# Patient Record
Sex: FEMALE | Race: WHITE | NOT HISPANIC OR LATINO | Employment: UNEMPLOYED | URBAN - METROPOLITAN AREA
[De-identification: names, ages, dates, MRNs, and addresses within clinical notes are randomized per-mention and may not be internally consistent; named-entity substitution may affect disease eponyms.]

---

## 2017-04-05 ENCOUNTER — ALLSCRIPTS OFFICE VISIT (OUTPATIENT)
Dept: OTHER | Facility: OTHER | Age: 32
End: 2017-04-05

## 2017-04-05 DIAGNOSIS — D25.9 LEIOMYOMA OF UTERUS: ICD-10-CM

## 2017-04-05 DIAGNOSIS — N93.9 ABNORMAL UTERINE AND VAGINAL BLEEDING, UNSPECIFIED: ICD-10-CM

## 2017-04-06 ENCOUNTER — HOSPITAL ENCOUNTER (OUTPATIENT)
Dept: RADIOLOGY | Age: 32
Discharge: HOME | End: 2017-04-06
Payer: COMMERCIAL

## 2017-04-06 DIAGNOSIS — N93.9 ABNORMAL UTERINE AND VAGINAL BLEEDING, UNSPECIFIED: ICD-10-CM

## 2017-04-06 DIAGNOSIS — D25.9 LEIOMYOMA OF UTERUS: ICD-10-CM

## 2017-04-06 PROCEDURE — 76830 TRANSVAGINAL US NON-OB: CPT

## 2017-04-06 PROCEDURE — 76856 US EXAM PELVIC COMPLETE: CPT

## 2017-04-11 ENCOUNTER — GENERIC CONVERSION - ENCOUNTER (OUTPATIENT)
Dept: OTHER | Facility: OTHER | Age: 32
End: 2017-04-11

## 2017-04-17 ENCOUNTER — GENERIC CONVERSION - ENCOUNTER (OUTPATIENT)
Dept: OTHER | Facility: OTHER | Age: 32
End: 2017-04-17

## 2017-04-21 ENCOUNTER — ALLSCRIPTS OFFICE VISIT (OUTPATIENT)
Dept: OTHER | Facility: OTHER | Age: 32
End: 2017-04-21

## 2017-04-24 ENCOUNTER — HOSPITAL ENCOUNTER (OUTPATIENT)
Facility: HOSPITAL | Age: 32
Setting detail: OBSERVATION
Discharge: HOME | End: 2017-04-24
Attending: EMERGENCY MEDICINE | Admitting: OBSTETRICS & GYNECOLOGY
Payer: COMMERCIAL

## 2017-04-24 VITALS
WEIGHT: 140 LBS | SYSTOLIC BLOOD PRESSURE: 104 MMHG | DIASTOLIC BLOOD PRESSURE: 55 MMHG | HEART RATE: 97 BPM | RESPIRATION RATE: 16 BRPM | OXYGEN SATURATION: 99 % | TEMPERATURE: 97.9 F

## 2017-04-24 DIAGNOSIS — N93.9 VAGINAL BLEEDING: Primary | ICD-10-CM

## 2017-04-24 DIAGNOSIS — D50.0 BLOOD LOSS ANEMIA: ICD-10-CM

## 2017-04-24 LAB
ABO GROUP BLD BPU: NORMAL
ABO GROUP BLD BPU: NORMAL
ABO GROUP BLD: NORMAL
ANION GAP SERPL CALCULATED.3IONS-SCNC: 9 MMOL/L (ref 4–13)
APTT PPP: 22 SECONDS (ref 24–36)
ATRIAL RATE: 80 BPM
BASOPHILS # BLD AUTO: 0.02 THOUSANDS/ΜL (ref 0–0.1)
BASOPHILS NFR BLD AUTO: 0 % (ref 0–1)
BLD GP AB SCN SERPL QL: NEGATIVE
BPU ID: NORMAL
BPU ID: NORMAL
BUN SERPL-MCNC: 12 MG/DL (ref 5–25)
CALCIUM SERPL-MCNC: 7.9 MG/DL (ref 8.3–10.1)
CHLORIDE SERPL-SCNC: 106 MMOL/L (ref 100–108)
CO2 SERPL-SCNC: 24 MMOL/L (ref 21–32)
CREAT SERPL-MCNC: 0.59 MG/DL (ref 0.6–1.3)
EOSINOPHIL # BLD AUTO: 0.04 THOUSAND/ΜL (ref 0–0.61)
EOSINOPHIL NFR BLD AUTO: 1 % (ref 0–6)
ERYTHROCYTE [DISTWIDTH] IN BLOOD BY AUTOMATED COUNT: 15.3 % (ref 11.6–15.1)
GFR SERPL CREATININE-BSD FRML MDRD: >60 ML/MIN/1.73SQ M
GLUCOSE SERPL-MCNC: 111 MG/DL (ref 65–140)
HCT VFR BLD AUTO: 20.9 % (ref 34.8–46.1)
HGB BLD-MCNC: 6.8 G/DL (ref 11.5–15.4)
INR PPP: 1.08 (ref 0.86–1.16)
LYMPHOCYTES # BLD AUTO: 2.88 THOUSANDS/ΜL (ref 0.6–4.47)
LYMPHOCYTES NFR BLD AUTO: 43 % (ref 14–44)
MCH RBC QN AUTO: 25.8 PG (ref 26.8–34.3)
MCHC RBC AUTO-ENTMCNC: 32.5 G/DL (ref 31.4–37.4)
MCV RBC AUTO: 79 FL (ref 82–98)
MONOCYTES # BLD AUTO: 0.44 THOUSAND/ΜL (ref 0.17–1.22)
MONOCYTES NFR BLD AUTO: 7 % (ref 4–12)
NEUTROPHILS # BLD AUTO: 3.32 THOUSANDS/ΜL (ref 1.85–7.62)
NEUTS SEG NFR BLD AUTO: 49 % (ref 43–75)
NRBC BLD AUTO-RTO: 0 /100 WBCS
P AXIS: 67 DEGREES
PLATELET # BLD AUTO: 248 THOUSANDS/UL (ref 149–390)
PMV BLD AUTO: 9.9 FL (ref 8.9–12.7)
POTASSIUM SERPL-SCNC: 3.4 MMOL/L (ref 3.5–5.3)
PR INTERVAL: 150 MS
PROTHROMBIN TIME: 14.1 SECONDS (ref 12–14.3)
QRS AXIS: 60 DEGREES
QRSD INTERVAL: 80 MS
QT INTERVAL: 350 MS
QTC INTERVAL: 403 MS
RBC # BLD AUTO: 2.64 MILLION/UL (ref 3.81–5.12)
RH BLD: NEGATIVE
SODIUM SERPL-SCNC: 139 MMOL/L (ref 136–145)
SPECIMEN EXPIRATION DATE: NORMAL
T WAVE AXIS: 37 DEGREES
UNIT DISPENSE STATUS: NORMAL
UNIT DISPENSE STATUS: NORMAL
UNIT PRODUCT CODE: NORMAL
UNIT PRODUCT CODE: NORMAL
UNIT RH: NORMAL
UNIT RH: NORMAL
VENTRICULAR RATE: 80 BPM
WBC # BLD AUTO: 6.73 THOUSAND/UL (ref 4.31–10.16)

## 2017-04-24 PROCEDURE — 36415 COLL VENOUS BLD VENIPUNCTURE: CPT | Performed by: EMERGENCY MEDICINE

## 2017-04-24 PROCEDURE — 86901 BLOOD TYPING SEROLOGIC RH(D): CPT | Performed by: EMERGENCY MEDICINE

## 2017-04-24 PROCEDURE — 86850 RBC ANTIBODY SCREEN: CPT | Performed by: EMERGENCY MEDICINE

## 2017-04-24 PROCEDURE — 80048 BASIC METABOLIC PNL TOTAL CA: CPT | Performed by: EMERGENCY MEDICINE

## 2017-04-24 PROCEDURE — 93005 ELECTROCARDIOGRAM TRACING: CPT | Performed by: EMERGENCY MEDICINE

## 2017-04-24 PROCEDURE — 99284 EMERGENCY DEPT VISIT MOD MDM: CPT

## 2017-04-24 PROCEDURE — 86923 COMPATIBILITY TEST ELECTRIC: CPT

## 2017-04-24 PROCEDURE — 85730 THROMBOPLASTIN TIME PARTIAL: CPT | Performed by: EMERGENCY MEDICINE

## 2017-04-24 PROCEDURE — 85025 COMPLETE CBC W/AUTO DIFF WBC: CPT | Performed by: EMERGENCY MEDICINE

## 2017-04-24 PROCEDURE — 86900 BLOOD TYPING SEROLOGIC ABO: CPT | Performed by: EMERGENCY MEDICINE

## 2017-04-24 PROCEDURE — 85610 PROTHROMBIN TIME: CPT | Performed by: EMERGENCY MEDICINE

## 2017-04-24 PROCEDURE — P9016 RBC LEUKOCYTES REDUCED: HCPCS

## 2017-04-24 PROCEDURE — 36430 TRANSFUSION BLD/BLD COMPNT: CPT

## 2017-04-24 RX ORDER — TRANEXAMIC ACID 650 1/1
1300 TABLET ORAL 3 TIMES DAILY
Status: DISCONTINUED | OUTPATIENT
Start: 2017-04-24 | End: 2017-04-24

## 2017-04-24 RX ORDER — TRANEXAMIC ACID 650 1/1
1300 TABLET ORAL 3 TIMES DAILY
Status: DISCONTINUED | OUTPATIENT
Start: 2017-04-24 | End: 2017-04-24 | Stop reason: HOSPADM

## 2017-04-24 RX ORDER — TRANEXAMIC ACID 650 1/1
1300 TABLET ORAL 3 TIMES DAILY
Qty: 30 TABLET | Refills: 0
Start: 2017-04-24 | End: 2017-04-26 | Stop reason: HOSPADM

## 2017-04-24 RX ADMIN — SODIUM CHLORIDE 1000 ML: 0.9 INJECTION, SOLUTION INTRAVENOUS at 06:04

## 2017-04-24 RX ADMIN — TRANEXAMIC ACID 2 TABLET: 650 TABLET ORAL at 12:49

## 2017-04-24 RX ADMIN — TRANEXAMIC ACID 2 TABLET: 650 TABLET, FILM COATED ORAL at 17:13

## 2017-04-25 ENCOUNTER — APPOINTMENT (OUTPATIENT)
Dept: LAB | Facility: HOSPITAL | Age: 32
End: 2017-04-25
Attending: OBSTETRICS & GYNECOLOGY
Payer: COMMERCIAL

## 2017-04-25 ENCOUNTER — HOSPITAL ENCOUNTER (OUTPATIENT)
Facility: HOSPITAL | Age: 32
Setting detail: OBSERVATION
Discharge: HOME | End: 2017-04-26
Attending: OBSTETRICS & GYNECOLOGY | Admitting: OBSTETRICS & GYNECOLOGY
Payer: COMMERCIAL

## 2017-04-25 ENCOUNTER — GENERIC CONVERSION - ENCOUNTER (OUTPATIENT)
Dept: OTHER | Facility: OTHER | Age: 32
End: 2017-04-25

## 2017-04-25 DIAGNOSIS — N93.9 ABNORMAL UTERINE AND VAGINAL BLEEDING, UNSPECIFIED: ICD-10-CM

## 2017-04-25 DIAGNOSIS — D25.9 LEIOMYOMA OF UTERUS: ICD-10-CM

## 2017-04-25 LAB
ABO GROUP BLD BPU: NORMAL
ABO GROUP BLD BPU: NORMAL
BPU ID: NORMAL
BPU ID: NORMAL
ERYTHROCYTE [DISTWIDTH] IN BLOOD BY AUTOMATED COUNT: 16.1 % (ref 11.6–15.1)
HCT VFR BLD AUTO: 19.4 % (ref 34.8–46.1)
HCT VFR BLD AUTO: 23.2 % (ref 34.8–46.1)
HGB BLD-MCNC: 6.4 G/DL (ref 11.5–15.4)
HGB BLD-MCNC: 7.8 G/DL (ref 11.5–15.4)
MCH RBC QN AUTO: 27.1 PG (ref 26.8–34.3)
MCHC RBC AUTO-ENTMCNC: 33 G/DL (ref 31.4–37.4)
MCV RBC AUTO: 82 FL (ref 82–98)
PLATELET # BLD AUTO: 230 THOUSANDS/UL (ref 149–390)
PMV BLD AUTO: 9.6 FL (ref 8.9–12.7)
RBC # BLD AUTO: 2.36 MILLION/UL (ref 3.81–5.12)
UNIT DISPENSE STATUS: NORMAL
UNIT DISPENSE STATUS: NORMAL
UNIT PRODUCT CODE: NORMAL
UNIT PRODUCT CODE: NORMAL
UNIT RH: NORMAL
UNIT RH: NORMAL
WBC # BLD AUTO: 7.33 THOUSAND/UL (ref 4.31–10.16)

## 2017-04-25 PROCEDURE — 85027 COMPLETE CBC AUTOMATED: CPT

## 2017-04-25 PROCEDURE — 86923 COMPATIBILITY TEST ELECTRIC: CPT

## 2017-04-25 PROCEDURE — 85018 HEMOGLOBIN: CPT | Performed by: OBSTETRICS & GYNECOLOGY

## 2017-04-25 PROCEDURE — 99285 EMERGENCY DEPT VISIT HI MDM: CPT

## 2017-04-25 PROCEDURE — 36415 COLL VENOUS BLD VENIPUNCTURE: CPT

## 2017-04-25 PROCEDURE — 85014 HEMATOCRIT: CPT | Performed by: OBSTETRICS & GYNECOLOGY

## 2017-04-25 PROCEDURE — P9040 RBC LEUKOREDUCED IRRADIATED: HCPCS

## 2017-04-25 RX ORDER — ACETAMINOPHEN 325 MG/1
650 TABLET ORAL EVERY 6 HOURS PRN
Status: DISCONTINUED | OUTPATIENT
Start: 2017-04-25 | End: 2017-04-26

## 2017-04-25 RX ORDER — DIPHENHYDRAMINE HYDROCHLORIDE 50 MG/ML
25 INJECTION INTRAMUSCULAR; INTRAVENOUS ONCE
Status: COMPLETED | OUTPATIENT
Start: 2017-04-25 | End: 2017-04-25

## 2017-04-25 RX ORDER — ONDANSETRON 2 MG/ML
4 INJECTION INTRAMUSCULAR; INTRAVENOUS EVERY 6 HOURS PRN
Status: DISCONTINUED | OUTPATIENT
Start: 2017-04-25 | End: 2017-04-27 | Stop reason: HOSPADM

## 2017-04-25 RX ORDER — METOCLOPRAMIDE HYDROCHLORIDE 5 MG/ML
10 INJECTION INTRAMUSCULAR; INTRAVENOUS EVERY 6 HOURS PRN
Status: DISCONTINUED | OUTPATIENT
Start: 2017-04-25 | End: 2017-04-27 | Stop reason: HOSPADM

## 2017-04-25 RX ORDER — ACETAMINOPHEN 325 MG/1
650 TABLET ORAL ONCE
Status: COMPLETED | OUTPATIENT
Start: 2017-04-25 | End: 2017-04-25

## 2017-04-25 RX ADMIN — SODIUM CHLORIDE, SODIUM LACTATE, POTASSIUM CHLORIDE, AND CALCIUM CHLORIDE 1000 ML: .6; .31; .03; .02 INJECTION, SOLUTION INTRAVENOUS at 12:18

## 2017-04-25 RX ADMIN — DIPHENHYDRAMINE HYDROCHLORIDE 25 MG: 50 INJECTION, SOLUTION INTRAMUSCULAR; INTRAVENOUS at 22:12

## 2017-04-25 RX ADMIN — CONJUGATED ESTROGENS 25 MG: 25 INJECTION, POWDER, LYOPHILIZED, FOR SOLUTION INTRAMUSCULAR; INTRAVENOUS at 14:56

## 2017-04-25 RX ADMIN — ACETAMINOPHEN 650 MG: 325 TABLET, FILM COATED ORAL at 22:11

## 2017-04-25 RX ADMIN — ACETAMINOPHEN 650 MG: 325 TABLET, FILM COATED ORAL at 19:10

## 2017-04-25 RX ADMIN — ONDANSETRON 4 MG: 2 INJECTION INTRAMUSCULAR; INTRAVENOUS at 19:12

## 2017-04-26 VITALS
WEIGHT: 137.13 LBS | HEART RATE: 92 BPM | RESPIRATION RATE: 14 BRPM | DIASTOLIC BLOOD PRESSURE: 66 MMHG | OXYGEN SATURATION: 100 % | TEMPERATURE: 98.5 F | SYSTOLIC BLOOD PRESSURE: 111 MMHG | BODY MASS INDEX: 23.41 KG/M2 | HEIGHT: 64 IN

## 2017-04-26 LAB
ABO GROUP BLD BPU: NORMAL
BASOPHILS # BLD AUTO: 0.04 THOUSANDS/ΜL (ref 0–0.1)
BASOPHILS NFR BLD AUTO: 0 % (ref 0–1)
BPU ID: NORMAL
EOSINOPHIL # BLD AUTO: 0.24 THOUSAND/ΜL (ref 0–0.61)
EOSINOPHIL NFR BLD AUTO: 2 % (ref 0–6)
ERYTHROCYTE [DISTWIDTH] IN BLOOD BY AUTOMATED COUNT: 15.7 % (ref 11.6–15.1)
HCT VFR BLD AUTO: 24 % (ref 34.8–46.1)
HCT VFR BLD AUTO: 31.8 % (ref 34.8–46.1)
HGB BLD-MCNC: 10.8 G/DL (ref 11.5–15.4)
HGB BLD-MCNC: 8.1 G/DL (ref 11.5–15.4)
LYMPHOCYTES # BLD AUTO: 4.14 THOUSANDS/ΜL (ref 0.6–4.47)
LYMPHOCYTES NFR BLD AUTO: 32 % (ref 14–44)
MCH RBC QN AUTO: 28.4 PG (ref 26.8–34.3)
MCHC RBC AUTO-ENTMCNC: 33.8 G/DL (ref 31.4–37.4)
MCV RBC AUTO: 84 FL (ref 82–98)
MONOCYTES # BLD AUTO: 0.83 THOUSAND/ΜL (ref 0.17–1.22)
MONOCYTES NFR BLD AUTO: 6 % (ref 4–12)
NEUTROPHILS # BLD AUTO: 7.77 THOUSANDS/ΜL (ref 1.85–7.62)
NEUTS SEG NFR BLD AUTO: 60 % (ref 43–75)
NRBC BLD AUTO-RTO: 0 /100 WBCS
PLATELET # BLD AUTO: 231 THOUSANDS/UL (ref 149–390)
PMV BLD AUTO: 10 FL (ref 8.9–12.7)
RBC # BLD AUTO: 2.85 MILLION/UL (ref 3.81–5.12)
UNIT DISPENSE STATUS: NORMAL
UNIT PRODUCT CODE: NORMAL
UNIT RH: NORMAL
WBC # BLD AUTO: 13.07 THOUSAND/UL (ref 4.31–10.16)

## 2017-04-26 PROCEDURE — 85025 COMPLETE CBC W/AUTO DIFF WBC: CPT | Performed by: OBSTETRICS & GYNECOLOGY

## 2017-04-26 PROCEDURE — P9038 RBC IRRADIATED: HCPCS

## 2017-04-26 PROCEDURE — 85018 HEMOGLOBIN: CPT | Performed by: OBSTETRICS & GYNECOLOGY

## 2017-04-26 PROCEDURE — P9040 RBC LEUKOREDUCED IRRADIATED: HCPCS

## 2017-04-26 PROCEDURE — 85014 HEMATOCRIT: CPT | Performed by: OBSTETRICS & GYNECOLOGY

## 2017-04-26 RX ORDER — ACETAMINOPHEN 325 MG/1
650 TABLET ORAL EVERY 6 HOURS PRN
Status: DISCONTINUED | OUTPATIENT
Start: 2017-04-26 | End: 2017-04-27 | Stop reason: HOSPADM

## 2017-04-26 RX ORDER — DIPHENHYDRAMINE HYDROCHLORIDE 50 MG/ML
25 INJECTION INTRAMUSCULAR; INTRAVENOUS EVERY 6 HOURS PRN
Status: DISCONTINUED | OUTPATIENT
Start: 2017-04-26 | End: 2017-04-27 | Stop reason: HOSPADM

## 2017-04-26 RX ADMIN — DIPHENHYDRAMINE HYDROCHLORIDE 25 MG: 50 INJECTION, SOLUTION INTRAMUSCULAR; INTRAVENOUS at 09:50

## 2017-04-26 RX ADMIN — ACETAMINOPHEN 650 MG: 325 TABLET, FILM COATED ORAL at 09:50

## 2017-05-03 ENCOUNTER — ALLSCRIPTS OFFICE VISIT (OUTPATIENT)
Dept: OTHER | Facility: OTHER | Age: 32
End: 2017-05-03

## 2017-07-18 ENCOUNTER — TRANSCRIBE ORDERS (OUTPATIENT)
Dept: ADMINISTRATIVE | Facility: HOSPITAL | Age: 32
End: 2017-07-18

## 2017-07-18 DIAGNOSIS — D25.9 UTERINE LEIOMYOMA, UNSPECIFIED LOCATION: Primary | ICD-10-CM

## 2017-08-08 ENCOUNTER — HOSPITAL ENCOUNTER (OUTPATIENT)
Dept: RADIOLOGY | Age: 32
Discharge: HOME | End: 2017-08-08
Payer: COMMERCIAL

## 2017-08-08 DIAGNOSIS — D25.9 UTERINE LEIOMYOMA, UNSPECIFIED LOCATION: ICD-10-CM

## 2017-08-08 PROCEDURE — 76830 TRANSVAGINAL US NON-OB: CPT

## 2017-08-08 PROCEDURE — 76856 US EXAM PELVIC COMPLETE: CPT

## 2017-10-28 ENCOUNTER — TRANSCRIBE ORDERS (OUTPATIENT)
Dept: ADMINISTRATIVE | Facility: HOSPITAL | Age: 32
End: 2017-10-28

## 2017-10-28 DIAGNOSIS — Z34.81 PRENATAL CARE, SUBSEQUENT PREGNANCY, FIRST TRIMESTER: ICD-10-CM

## 2017-10-28 DIAGNOSIS — Z13.0 SCREENING FOR IRON DEFICIENCY ANEMIA: Primary | ICD-10-CM

## 2017-10-28 DIAGNOSIS — N92.6 IRREGULAR MENSTRUAL CYCLE: ICD-10-CM

## 2017-10-31 ENCOUNTER — APPOINTMENT (OUTPATIENT)
Dept: LAB | Age: 32
End: 2017-10-31
Payer: COMMERCIAL

## 2017-10-31 ENCOUNTER — HOSPITAL ENCOUNTER (OUTPATIENT)
Dept: RADIOLOGY | Age: 32
Discharge: HOME | End: 2017-10-31
Payer: COMMERCIAL

## 2017-10-31 DIAGNOSIS — Z34.81 PRENATAL CARE, SUBSEQUENT PREGNANCY, FIRST TRIMESTER: ICD-10-CM

## 2017-10-31 DIAGNOSIS — N92.6 IRREGULAR MENSTRUAL CYCLE: ICD-10-CM

## 2017-10-31 DIAGNOSIS — Z13.0 SCREENING FOR IRON DEFICIENCY ANEMIA: ICD-10-CM

## 2017-10-31 LAB
B-HCG SERPL-ACNC: ABNORMAL MIU/ML
TSH SERPL DL<=0.05 MIU/L-ACNC: 1.54 UIU/ML (ref 0.36–3.74)

## 2017-10-31 PROCEDURE — 84702 CHORIONIC GONADOTROPIN TEST: CPT

## 2017-10-31 PROCEDURE — 86803 HEPATITIS C AB TEST: CPT

## 2017-10-31 PROCEDURE — 76801 OB US < 14 WKS SINGLE FETUS: CPT

## 2017-10-31 PROCEDURE — 84443 ASSAY THYROID STIM HORMONE: CPT | Performed by: OBSTETRICS & GYNECOLOGY

## 2017-10-31 PROCEDURE — 83020 HEMOGLOBIN ELECTROPHORESIS: CPT

## 2017-10-31 PROCEDURE — 36415 COLL VENOUS BLD VENIPUNCTURE: CPT

## 2017-11-01 LAB — HCV AB SER QL: NORMAL

## 2017-11-02 ENCOUNTER — APPOINTMENT (OUTPATIENT)
Dept: LAB | Age: 32
End: 2017-11-02
Payer: COMMERCIAL

## 2017-11-02 ENCOUNTER — TRANSCRIBE ORDERS (OUTPATIENT)
Dept: ADMINISTRATIVE | Age: 32
End: 2017-11-02

## 2017-11-02 DIAGNOSIS — Z13.0 SCREENING FOR IRON DEFICIENCY ANEMIA: ICD-10-CM

## 2017-11-02 DIAGNOSIS — Z34.81 PRENATAL CARE, SUBSEQUENT PREGNANCY, FIRST TRIMESTER: ICD-10-CM

## 2017-11-02 DIAGNOSIS — Z34.81 PRENATAL CARE, SUBSEQUENT PREGNANCY, FIRST TRIMESTER: Primary | ICD-10-CM

## 2017-11-02 LAB
BASOPHILS # BLD AUTO: 0.01 THOUSANDS/ΜL (ref 0–0.1)
BASOPHILS NFR BLD AUTO: 0 % (ref 0–1)
BILIRUB UR QL STRIP: NEGATIVE
CLARITY UR: CLEAR
COLOR UR: YELLOW
EOSINOPHIL # BLD AUTO: 0.06 THOUSAND/ΜL (ref 0–0.61)
EOSINOPHIL NFR BLD AUTO: 1 % (ref 0–6)
ERYTHROCYTE [DISTWIDTH] IN BLOOD BY AUTOMATED COUNT: 14.4 % (ref 11.6–15.1)
GLUCOSE UR STRIP-MCNC: NEGATIVE MG/DL
HCT VFR BLD AUTO: 34.3 % (ref 34.8–46.1)
HGB BLD-MCNC: 11.4 G/DL (ref 11.5–15.4)
HGB UR QL STRIP.AUTO: NEGATIVE
KETONES UR STRIP-MCNC: NEGATIVE MG/DL
LEUKOCYTE ESTERASE UR QL STRIP: NEGATIVE
LYMPHOCYTES # BLD AUTO: 2.06 THOUSANDS/ΜL (ref 0.6–4.47)
LYMPHOCYTES NFR BLD AUTO: 29 % (ref 14–44)
MCH RBC QN AUTO: 26.3 PG (ref 26.8–34.3)
MCHC RBC AUTO-ENTMCNC: 33.2 G/DL (ref 31.4–37.4)
MCV RBC AUTO: 79 FL (ref 82–98)
MONOCYTES # BLD AUTO: 0.46 THOUSAND/ΜL (ref 0.17–1.22)
MONOCYTES NFR BLD AUTO: 7 % (ref 4–12)
NEUTROPHILS # BLD AUTO: 4.51 THOUSANDS/ΜL (ref 1.85–7.62)
NEUTS SEG NFR BLD AUTO: 63 % (ref 43–75)
NITRITE UR QL STRIP: NEGATIVE
NRBC BLD AUTO-RTO: 0 /100 WBCS
PH UR STRIP.AUTO: 6.5 [PH] (ref 4.5–8)
PLATELET # BLD AUTO: 226 THOUSANDS/UL (ref 149–390)
PMV BLD AUTO: 9.7 FL (ref 8.9–12.7)
PROT UR STRIP-MCNC: NEGATIVE MG/DL
RBC # BLD AUTO: 4.33 MILLION/UL (ref 3.81–5.12)
SP GR UR STRIP.AUTO: 1.03 (ref 1–1.03)
UROBILINOGEN UR QL STRIP.AUTO: 0.2 E.U./DL
WBC # BLD AUTO: 7.12 THOUSAND/UL (ref 4.31–10.16)

## 2017-11-02 PROCEDURE — 80081 OBSTETRIC PANEL INC HIV TSTG: CPT

## 2017-11-02 PROCEDURE — 36415 COLL VENOUS BLD VENIPUNCTURE: CPT

## 2017-11-02 PROCEDURE — 87086 URINE CULTURE/COLONY COUNT: CPT

## 2017-11-02 PROCEDURE — 81003 URINALYSIS AUTO W/O SCOPE: CPT

## 2017-11-02 PROCEDURE — 86870 RBC ANTIBODY IDENTIFICATION: CPT

## 2017-11-03 LAB
ABO GROUP BLD: NORMAL
BACTERIA UR CULT: ABNORMAL
BLD GP AB SCN SERPL QL: POSITIVE
BLOOD GROUP ANTIBODIES SERPL: NORMAL
HBV SURFACE AG SER QL: NORMAL
HGB A MFR BLD: 2.5 % (ref 0.7–3.1)
HGB A MFR BLD: 97.5 % (ref 94–98)
HGB C MFR BLD: 0 %
HGB F MFR BLD: 0 % (ref 0–2)
HGB FRACT BLD-IMP: NORMAL
HGB S BLD QL SOLY: NEGATIVE
HGB S MFR BLD: 0 %
HIV 1+2 AB+HIV1 P24 AG SERPL QL IA: NORMAL
RH BLD: NEGATIVE
RPR SER QL: NORMAL
RUBV IGG SERPL IA-ACNC: >175 IU/ML
SPECIMEN EXPIRATION DATE: NORMAL

## 2017-11-03 PROCEDURE — 36415 COLL VENOUS BLD VENIPUNCTURE: CPT

## 2017-11-27 PROCEDURE — 87591 N.GONORRHOEAE DNA AMP PROB: CPT | Performed by: OBSTETRICS & GYNECOLOGY

## 2017-11-27 PROCEDURE — G0145 SCR C/V CYTO,THINLAYER,RESCR: HCPCS | Performed by: OBSTETRICS & GYNECOLOGY

## 2017-11-27 PROCEDURE — 87491 CHLMYD TRACH DNA AMP PROBE: CPT | Performed by: OBSTETRICS & GYNECOLOGY

## 2017-11-27 PROCEDURE — 87624 HPV HI-RISK TYP POOLED RSLT: CPT | Performed by: OBSTETRICS & GYNECOLOGY

## 2017-11-28 ENCOUNTER — LAB REQUISITION (OUTPATIENT)
Dept: LAB | Facility: HOSPITAL | Age: 32
End: 2017-11-28
Payer: COMMERCIAL

## 2017-11-28 DIAGNOSIS — Z11.51 ENCOUNTER FOR SCREENING FOR HUMAN PAPILLOMAVIRUS (HPV): ICD-10-CM

## 2017-11-28 DIAGNOSIS — Z12.72 ENCOUNTER FOR SCREENING FOR MALIGNANT NEOPLASM OF VAGINA: ICD-10-CM

## 2017-11-28 DIAGNOSIS — Z01.419 ENCOUNTER FOR GYNECOLOGICAL EXAMINATION WITHOUT ABNORMAL FINDING: ICD-10-CM

## 2017-11-30 ENCOUNTER — GENERIC CONVERSION - ENCOUNTER (OUTPATIENT)
Dept: OTHER | Facility: OTHER | Age: 32
End: 2017-11-30

## 2017-11-30 LAB
CHLAMYDIA DNA CVX QL NAA+PROBE: NORMAL
N GONORRHOEA DNA GENITAL QL NAA+PROBE: NORMAL

## 2017-12-04 ENCOUNTER — GENERIC CONVERSION - ENCOUNTER (OUTPATIENT)
Dept: OTHER | Facility: OTHER | Age: 32
End: 2017-12-04

## 2017-12-04 ENCOUNTER — LAB REQUISITION (OUTPATIENT)
Dept: LAB | Facility: HOSPITAL | Age: 32
End: 2017-12-04
Payer: COMMERCIAL

## 2017-12-04 ENCOUNTER — ALLSCRIPTS OFFICE VISIT (OUTPATIENT)
Dept: PERINATAL CARE | Facility: CLINIC | Age: 32
End: 2017-12-04
Payer: COMMERCIAL

## 2017-12-04 DIAGNOSIS — Z12.72 ENCOUNTER FOR SCREENING FOR MALIGNANT NEOPLASM OF VAGINA: ICD-10-CM

## 2017-12-04 DIAGNOSIS — Z01.419 ENCOUNTER FOR GYNECOLOGICAL EXAMINATION WITHOUT ABNORMAL FINDING: ICD-10-CM

## 2017-12-04 DIAGNOSIS — Z11.51 ENCOUNTER FOR SCREENING FOR HUMAN PAPILLOMAVIRUS (HPV): ICD-10-CM

## 2017-12-04 PROCEDURE — 76801 OB US < 14 WKS SINGLE FETUS: CPT | Performed by: OBSTETRICS & GYNECOLOGY

## 2017-12-04 PROCEDURE — 76813 OB US NUCHAL MEAS 1 GEST: CPT | Performed by: OBSTETRICS & GYNECOLOGY

## 2017-12-07 ENCOUNTER — LAB CONVERSION - ENCOUNTER (OUTPATIENT)
Dept: OTHER | Facility: OTHER | Age: 32
End: 2017-12-07

## 2017-12-07 LAB
AGE RISK DOWN SYNDROME (HISTORICAL): NORMAL
CALC'D GESTATIONAL AGE (HISTORICAL): 12.7
CHLAMYDIA DNA CVX QL NAA+PROBE: NORMAL
CIGARETTE SMOKER (HISTORICAL): NO
COLLECTION DATE (HISTORICAL): NORMAL
CROWN RUMP LENGTH (HISTORICAL): 65 MM
CROWN RUMP LENGTH (HISTORICAL): NORMAL MM
DATE OF BIRTH (HISTORICAL): NORMAL
DONOR AGE; EGG RETRIEVAL (HISTORICAL): NORMAL
DONOR EGG (HISTORICAL): NO
EDD DETERMINED BY (HISTORICAL): NORMAL
ESTIMATED DELIVERY DATE (EDD) (HISTORICAL): NORMAL
HCG MOM (HISTORICAL): 0.36
HCG QUANTITATIVE (HISTORICAL): 31.7 IU/ML
HX OF NEURAL TUBE DEFECTS (HISTORICAL): NO
IF TWINS (HISTORICAL): NORMAL
INSULIN DEP. DIABETIC (HISTORICAL): NO
INTERPRETATION (HISTORICAL): NORMAL
MATERNAL WEIGHT (HISTORICAL): 139 LBS
MSS DOWN SYNDROME RISK (HISTORICAL): NORMAL
MSS3 TRISOMY 18 RISK (HISTORICAL): NORMAL
N GONORRHOEA DNA GENITAL QL NAA+PROBE: NORMAL
NASAL BONE (HISTORICAL): NORMAL
NASAL BONE (HISTORICAL): PRESENT
NT MOM (HISTORICAL): 1.02
NTQR LOCATION ID (HISTORICAL): NORMAL
NTQR READING PHYS ID (HISTORICAL): NORMAL
NUCHAL TRANSLUCENCY (HISTORICAL): 1.5 MM
NUCHAL TRANSLUCENCY (HISTORICAL): NORMAL MM
NUMBER OF FETUSES (HISTORICAL): 1
PAPP-A (HISTORICAL): 0.69
PAPP-A (HISTORICAL): 689.3 NG/ML
PREV PREGNANCY DOWN SYND (HISTORICAL): NO
RACE/ETHNIC ORIGIN (HISTORICAL): NORMAL
REFERRING PHYSICIAN (HISTORICAL): NORMAL
REFERRING PHYSICIAN NPI (HISTORICAL): NORMAL
REFERRING PHYSICIAN PHONE (HISTORICAL): NORMAL
REPEAT SPECIMEN (HISTORICAL): NO
ULTRASONOGRAPHER ID (HISTORICAL): NORMAL
ULTRASOUND DATE (HISTORICAL): NORMAL

## 2017-12-08 LAB — HPV RRNA GENITAL QL NAA+PROBE: NORMAL

## 2017-12-12 LAB
LAB AP GYN PRIMARY INTERPRETATION: NORMAL
LAB AP LMP: NORMAL
Lab: NORMAL

## 2017-12-29 ENCOUNTER — LAB CONVERSION - ENCOUNTER (OUTPATIENT)
Dept: OTHER | Facility: OTHER | Age: 32
End: 2017-12-29

## 2017-12-29 LAB
AFP (HISTORICAL): 0.95
AFP (HISTORICAL): 31.9 NG/ML
AGE RISK DOWN SYNDROME (HISTORICAL): NORMAL
CALC'D GESTATIONAL AGE (HISTORICAL): 15.9
CIGARETTE SMOKER (HISTORICAL): NO
COLLECTION DATE (HISTORICAL): NORMAL
CROWN RUMP LENGTH (HISTORICAL): 65 MM
DATE OF BIRTH (HISTORICAL): NORMAL
ESTIMATED DELIVERY DATE (EDD) (HISTORICAL): NORMAL
ESTRADIOL, FREE (HISTORICAL): 0.57 NG/ML
ESTRIOL MOM (HISTORICAL): 0.75
HCG MOM (HISTORICAL): 0.16
HCG QUANTITATIVE (HISTORICAL): 5.8 IU/ML
HX OF NEURAL TUBE DEFECTS (HISTORICAL): NO
INHIBIN A (HISTORICAL): 60 PG/ML
INHIBIN A MOM (HISTORICAL): 0.34
INSULIN DEP. DIABETIC (HISTORICAL): NO
INTERPRETATION (HISTORICAL): NORMAL
MATERNAL WEIGHT (HISTORICAL): 139 LBS
MSAFP RISK OPEN NTD (HISTORICAL): NORMAL
MSS DOWN SYNDROME RISK (HISTORICAL): NORMAL
MSS3 TRISOMY 18 RISK (HISTORICAL): NORMAL
NASAL BONE (HISTORICAL): NORMAL
NASAL BONE (HISTORICAL): PRESENT
NT MOM (HISTORICAL): 1.02
NUCHAL TRANSLUCENCY (HISTORICAL): 1.5 MM
NUMBER OF FETUSES (HISTORICAL): 1
PAPP-A (HISTORICAL): 0.69
PAPP-A (HISTORICAL): 689.3 NG/ML
RACE/ETHNIC ORIGIN (HISTORICAL): NORMAL
REFERRING PHYSICIAN (HISTORICAL): NORMAL
REFERRING PHYSICIAN NPI (HISTORICAL): NORMAL
REFERRING PHYSICIAN PHONE (HISTORICAL): NORMAL
REPEAT SPECIMEN (HISTORICAL): NO
SPECIMEN: (HISTORICAL): NORMAL
ULTRASOUND DATE (HISTORICAL): NORMAL

## 2017-12-30 ENCOUNTER — HOSPITAL ENCOUNTER (OUTPATIENT)
Facility: HOSPITAL | Age: 32
Discharge: HOME | End: 2017-12-30
Attending: OBSTETRICS & GYNECOLOGY | Admitting: OBSTETRICS & GYNECOLOGY
Payer: COMMERCIAL

## 2017-12-30 VITALS — HEIGHT: 64 IN | RESPIRATION RATE: 16 BRPM | TEMPERATURE: 98 F | BODY MASS INDEX: 24.75 KG/M2 | WEIGHT: 145 LBS
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
## 2017-12-30 PROCEDURE — 99201 HB OFFICE/OUTPATIENT VISIT NEW (BRIEF): CPT

## 2017-12-31 NOTE — DISCHARGE INSTRUCTIONS
Pregnancy at 15 to 18 [address]:   Now that you are in your second trimester, you have more energy  You may also feel hungrier than usual  You may start to experience other symptoms, such as heartburn or dizziness  You may be gaining about ½ to 1 pound a week, and your pregnancy is beginning to show  You may need to start wearing maternity clothes  DISCHARGE INSTRUCTIONS:   Seek care immediately if:   · You have pain or cramping in your abdomen or low back  · You have heavy vaginal bleeding or clotting  · You pass material that looks like tissue or large clots  Collect the material and bring it with you  Contact your healthcare provider if:   · You cannot keep food or drinks down, and you are losing weight  · You have light bleeding  · You have chills or a fever  · You have vaginal itching, burning, or pain  · You have yellow, green, white, or foul-smelling vaginal discharge  · You have pain or burning when you urinate, less urine than usual, or pink or bloody urine  · You have questions or concerns about your condition or care  How to care for yourself at this stage of your pregnancy:   · Manage heartburn  by eating 4 or 5 small meals each day instead of large meals  Avoid spicy foods  Avoid eating right before bedtime  · Manage nausea and vomiting  Avoid fatty and spicy foods  Eat small meals throughout the day instead of large meals  Lynn may help to decrease nausea  Ask your healthcare provider about other ways of decreasing nausea and vomiting  · Eat a variety of healthy foods  Healthy foods include fruits, vegetables, whole-grain breads, low-fat dairy foods, beans, lean meats, and fish  Drink liquids as directed  Ask how much liquid to drink each day and which liquids are best for you  Limit caffeine to less than 200 milligrams each day  Limit your intake of fish to 2 servings each week   Choose fish low in mercury such as canned light tuna, shrimp, salmon, cod, or tilapia  Do not  eat fish high in mercury such as swordfish, tilefish, ana mackerel, and shark  · Take prenatal vitamins as directed  Your need for certain vitamins and minerals, such as folic acid, increases during pregnancy  Prenatal vitamins provide some of the extra vitamins and minerals you need  Prenatal vitamins may also help to decrease the risk of certain birth defects  · Do not smoke  If you smoke, it is never too late to quit  Smoking increases your risk of a miscarriage and other health problems during your pregnancy  Smoking can cause your baby to be born too early or weigh less at birth  Ask your healthcare provider for information if you need help quitting  · Do not drink alcohol  Alcohol passes from your body to your baby through the placenta  It can affect your baby's brain development and cause fetal alcohol syndrome (FAS)  FAS is a group of conditions that causes mental, behavior, and growth problems  · Talk to your healthcare provider before you take any medicines  Many medicines may harm your baby if you take them when you are pregnant  Do not take any medicines, vitamins, herbs, or supplements without first talking to your healthcare provider  Never use illegal or street drugs (such as marijuana or cocaine) while you are pregnant  Safety tips during pregnancy:   · Avoid hot tubs and saunas  Do not use a hot tub or sauna while you are pregnant, especially during your first trimester  Hot tubs and saunas may raise your baby's temperature and increase the risk of birth defects  · Avoid toxoplasmosis  This is an infection caused by eating raw meat or being around infected cat feces  It can cause birth defects, miscarriages, and other problems  Wash your hands after you touch raw meat  Make sure any meat is well-cooked before you eat it  Avoid raw eggs and unpasteurized milk   Use gloves or ask someone else to clean your cat's litter box while you are pregnant  Changes that are happening with your baby:  By 18 weeks, your baby may be about 6 inches long from the top of the head to the rump (baby's bottom)  Your baby may weighabout 11 ounces  You may be able to feel your baby's movement at about 18 weeks or later  The first movements may not be that noticeable  They may feel like a fluttering sensation  Your baby also makes sucking movements and can hear certain sounds  What you need to know about prenatal care:  During the first 28 weeks of your pregnancy, you will see your healthcare provider once a month  Your healthcare provider will check your blood pressure and weight  You may also need any of the following:  · A urine test  may also be done to check for sugar and protein  These can be signs of gestational diabetes or infection  · A blood test  may be done to check for anemia (low iron level)  · Fundal height check  is a measurement of your uterus to check your baby's growth  This number is usually the same as the number of weeks that you have been pregnant  · An ultrasound  may be done to check your baby's development  Your healthcare provider may be able to tell you what your baby's gender is during the ultrasound  · Your baby's heart rate  will be checked  © 2017 [address] Information is for End User's use only and may not be sold, redistributed or otherwise used for commercial purposes  All illustrations and images included in CareNotes® are the copyrighted property of A D A M , Inc  or Nawaf Jimenez  The above information is an  only  It is not intended as medical advice for individual conditions or treatments  Talk to your doctor, nurse or pharmacist before following any medical regimen to see if it is safe and effective for you

## 2017-12-31 NOTE — PROGRESS NOTES
OB TRIAGE NOTE  Matt Mullins  [id number]  2017  10:25 PM  L&D 329/L&D 329-02    ASSESS:  29 y o  P2 at ~16-17y wks gestation s/p fall    PLAN  #1  Pregnancy s/p fall:   Rh neg, s/p RhoGam 6 wks ago  Repeat Rhogam not indicated  No vaginal bleeding  FHT and ultrasound reassuring  Pt reassured, discharged home     #2  Large fibroid  Pt aware of diagnosis  Delivered 2x at term with fibroid    Dispo: discharge to home  ______________    SUBJECTIVE  HPI Chronology:  29 y o   at ~16 wks gestatopm presents with complaint of fall early today while playing with her son  She fell to her side and landed on her right hip  She reports right hip/thigh is tender, but she is able to ambulate  She is worried about her baby and wants reassurance  Of note, pt has large fibroid and has delivered x 2 ( and C/S, breech)    Contractions: no  Leakage: no  Bleeding: no  Fetal Movement: yes  Pelvic pain: no    OBJECTIVE  Vitals:    17 2213   Resp: 16   Temp: 98 °F (36 7 °C)   TempSrc: Oral   Weight: 65 8 kg (145 lb)   Height: 5' 4" (1 626 m)       Exam:  Physical Exam   Constitutional: She appears well-developed and well-nourished  No distress  HENT:   Head: Normocephalic and atraumatic  Pulmonary/Chest: No respiratory distress  Abdominal: Soft  She exhibits no distension  There is no tenderness  There is no rebound  Skin: Skin is warm and dry  Psychiatric: She has a normal mood and affect  Her behavior is normal      FHR: 140 by dopplers    Labs:  Results for Emerita Mathews (MRN [id number]) as of 2017 22:25   Ref   Range 2017 16:33   ABO Grouping Unknown A   Rh Factor Unknown Negative   Antibody Screen Unknown Positive   Specimen Expiration Date Unknown [id number]   RPR SCREEN Latest Ref Range: Non-Reactive  Non-Reactive   HEPATITIS B SURFACE ANTIGEN Latest Ref Range: Non-reactive, NonReactive - Confirmed  Non-reactive   RUBELLA IGG QUANTITATION Latest Ref Range: >9 9 IU/mL >175 0   HIV-1/2 AB-AG Latest Ref Range: Non-Reactive  Non-Reactive     Imaging:  Large 12cm fibroid anteriorly  Fetus at fundus and in variable position  (+) fetal movement with adequate fluid

## 2018-01-10 NOTE — RESULT NOTES
Verified Results  ([facility]) Pap IG, rfx HPV ASCU [id number] 07:68QO Kun Rojas     Test Name Result Flag Reference   DIAGNOSIS: Comment     NEGATIVE FOR INTRAEPITHELIAL LESION AND MALIGNANCY  Specimen adequacy: Comment     Satisfactory for evaluation  Endocervical and/or squamous metaplastic  cells (endocervical component) are present  Clinician provided ICD10: Comment     Z01 419   Performed by: Jigar Mendosa, Cytotechnologist       Note: Comment     The Pap smear is a screening test designed to aid in the detection of  premalignant and malignant conditions of the uterine cervix  It is not a  diagnostic procedure and should not be used as the sole means of detecting  cervical cancer  Both false-positive and false-negative reports do occur    Comment     The HPV DNA reflex criteria were not met with this specimen result  therefore, no HPV testing was performed

## 2018-01-11 NOTE — MISCELLANEOUS
Message      Recorded as Task   Date: 01/19/2016 08:39 AM, Created By: Janae Mike   Task Name: Medical Complaint Callback   Assigned To: Nita Dejesus   Regarding Patient: Yelena Guerrero, Status: Active   Comment:    CarmelaSarkis - 19 Jan 2016 8:39 AM     TASK CREATED  Caller: Self; Medical Complaint; [phone] (Home); [phone] (Work)  patient has been bleeding for 2 weeks  periods are getting heavier  patient would like to try the medication you previously suggested   spoke with patient  will stop all hormones for right now  will watch bleeding over next few days  If increases, will start Lysteda for 5 days  Category B  Patient to call us with increased bleeding        Plan  Abnormal uterine bleeding (AUB)    · Junel FE 1/20 1-20 MG-MCG Oral Tablet; TAKE 1 TABLET DAILY AS DIRECTED   · Tranexamic Acid 650 MG Oral Tablet; TAKE 2 TABLET 3 times daily    Signatures   Electronically signed by : Ariel Calderon DO; Jan 20 5881  1:05PM EST                       (Author)

## 2018-01-11 NOTE — RESULT NOTES
Verified Results  (1) CBC/ PLT (NO DIFF) [id number] 23:46UU Margi Barrios Order Number: [id number]     Test Name Result Flag Reference   HEMATOCRIT 19 4 % L 34 8-46 1   HEMOGLOBIN 6 4 g/dL LL 11 5-15 4   This result has been called to [facility] by Alex Geiger on 04 25 2017 at 1117, and has been read back     MCHC 33 0 g/dL  31 4-37 4   MCH 27 1 pg  26 8-34 3   MCV 82 fL  82-98   PLATELET COUNT 950 Thousands/uL  149-390   RBC COUNT 2 36 Million/uL L 3 81-5 12   RDW 16 1 % H 11 6-15 1   WBC COUNT 7 33 Thousand/uL  4 31-10 16   MPV 9 6 fL  8 9-12 7

## 2018-01-12 VITALS
HEIGHT: 63 IN | SYSTOLIC BLOOD PRESSURE: 108 MMHG | BODY MASS INDEX: 24.63 KG/M2 | WEIGHT: 139 LBS | DIASTOLIC BLOOD PRESSURE: 68 MMHG | HEART RATE: 68 BPM

## 2018-01-12 NOTE — RESULT NOTES
Verified Results  * [facility] [id number] 69:82EG Samra Cordial Order Number: [id number]    - Patient Instructions: To schedule this appointment, please contact Central Scheduling at [phone]  Test Name Result Flag Reference   US PELVIS COMPLETE (TRANSABDOMINAL AND TRANSVAGINAL) (Report)     PELVIC ULTRASOUND, COMPLETE     INDICATION: Excessive prolonged menses  COMPARISON: 1/12/2016 pelvic ultrasound     TECHNIQUE:  Transabdominal pelvic ultrasound was performed in sagittal and transverse planes with a curvilinear transducer  Additional transvaginal imaging was performed to better evaluate the endometrium and ovaries  Imaging included volumetric    sweeps as well as traditional still imaging technique  FINDINGS:     UTERUS:   The uterus is anteverted in position, measuring   cm  Contour and echotexture appear heterogeneous  There is an 8 4 x 8 5 x 6 6 cm hypoechoic shadowing mass within the uterine body obscures the endometrial stripe near the fundus  The cervix shows no suspicious abnormality  ENDOMETRIUM:    Normal caliber of 10 mm  Homogenous and normal in appearance  OVARIES/ADNEXA:   Right ovary: 3 2 x 0 8 x 2 cm  No suspicious right ovarian abnormality  Doppler flow within normal limits  Left ovary: 2 9 x 1 9 x 1 3 cm  No suspicious left ovarian abnormality  Doppler flow within normal limits  No suspicious adnexal mass or loculated collections  There is no free fluid  IMPRESSION:   1  Large uterine body fibroid which appears predominantly intramural however obscures the endometrial stripe near the fundus  2  Normal appearance of the ovaries            Workstation performed: [id number]     Signed by:   Stacia Jose MD   4/9/17

## 2018-01-12 NOTE — RESULT NOTES
Verified Results  * US PELVIS COMPLETE (TRANSABDOMINAL AND TRANSVAGINAL) [id number] 42:62KL Fermín Rojas     Test Name Result Flag Reference   US PELVIS COMPLETE (TRANSABDOMINAL AND TRANSVAGINAL) (Report)     PELVIC ULTRASOUND, COMPLETE     INDICATION: Irregular menses  LMP 10 days ago  COMPARISON: 8/28/2007     TECHNIQUE:  Transabdominal pelvic ultrasound was performed in sagittal and transverse planes with a curvilinear transducer  Additional transvaginal imaging was performed to better evaluate the endometrium and ovaries  Imaging included volumetric    sweeps as well as traditional still imaging technique  FINDINGS:     UTERUS:   The uterus is anteverted in position, measuring 11 9 x 7 5 x 8 8 cm  Large uterine body fibroid identified measuring 8 1 x 6 4 x 7 3 cm  The cervix shows no suspicious abnormality  ENDOMETRIUM:    Normal caliber of 5 mm  Homogenous and normal in appearance  OVARIES/ADNEXA:   Right ovary: 2 5 x 1 3 x 1 3 cm  No suspicious right ovarian abnormality  Doppler flow within normal limits  Left ovary: 3 2 x 1 5 x 2 1 cm  No suspicious left ovarian abnormality  Doppler flow within normal limits  No suspicious adnexal mass or loculated collections  Minimal free fluid in the cul-de-sac, likely physiologic  1  Fibroid uterus  2  Unremarkable ovaries  (1) VITAMIN D 25-HYDROXY [id number] 94:23BT Ervin Viedeasonia Order Number: [id number]     Order Number: [id number] Order Number: [id number]     Test Name Result Flag Reference   VIT D 25-HYDROX 18 2 ng/mL L 30 0-100 0     (1) TSH WITH FT4 REFLEX [id number] 97:31TV Ervin Leyden Order Number: [id number]    Patients undergoing fluorescein dye angiography may retain small amounts of fluorescein in the body for 48-72 hours post procedure  Samples containing fluorescein can produce falsely depressed TSH values   If the patient had this procedure,a specimen should be resubmitted post fluorescein clearance  The recommended reference ranges for TSH during pregnancy are as follows:  First trimester 0 1 to 2 5 uIU/mL  Second trimester  0 2 to 3 0 uIU/mL  Third trimester 0 3 to 3 0 uIU/m     Test Name Result Flag Reference   TSH 2 040 uIU/mL  0 358-3 740     (1) CBC/ PLT (NO DIFF) [id number] 76:64SI Kahlil [facility] Order Number: [id number]     Order Number: [id number]     Test Name Result Flag Reference   HEMATOCRIT 33 5 % L 34 8-46 1   HEMOGLOBIN 10 6 g/dL L 11 5-15 4   MCHC 31 6 g/dL  31 4-37 4   MCH 25 0 pg L 26 8-34 3   MCV 79 0 fL L 82 0-98 0   PLATELET COUNT 977 Thousands/uL  149-390   RBC COUNT 4 24 Million/uL  3 81-5 12   RDW 15 2 % H 11 6-15 1   WBC COUNT 5 22 Thousand/uL  4 31-10 16   MPV 10 3 fL  8 9-12 7       Plan  Abnormal uterine bleeding (AUB)    · Junel FE 1 5/30 1 5-30 MG-MCG Oral Tablet;  Take 1 tablet daily

## 2018-01-13 NOTE — MISCELLANEOUS
Message   Recorded as Task   Date: 04/11/2017 10:04 AM, Created By: Abdi Mejia   Task Name: Medical Complaint Callback   Assigned To: Pablo Prieto   Regarding Patient: Jono Rosa, Status: Active   Comment:    Sarkis Casey - 11 Apr 2017 10:04 AM     TASK CREATED  Caller: Self; Medical Complaint; [phone] (Home); [phone] (Work)  patient's insurance wont cover the [facility] to take 3 daily continuesly  also when she was taking it she still was having bleeding  spoke with pharmacist and they told her about a medication to try that helps stop the bleeding  didnt remember the name of the med  also states she is waiting to talk to you about her u/s results  spoke to patient  bleeding has slowed with 3 pills/day  LoLoestrin is not covered, can get junel which is covered  Recommend continuing 3 pills a day until done then restarting junel, one daily  Reviewed US results, no significant change in fibroid  Patient will follow up in one week with bleeding pattern  wants to get pregnant late summer  Signatures   Electronically signed by : Shaw Camara DO;  Apr 11 2017  1:51PM EST                       (Author)

## 2018-01-13 NOTE — MISCELLANEOUS
Message  Patient was admitted yesterday at [facility] and got 2 units of PRBC  OCPs stopped, lysteda started  Bleeding slowed down a bit but patient states is heavy again this am and she can feel her heart pounding and feels a bit week  No syncope  Lysmirandaa does not seem to be working at this time  Passed 4 clots in bathroom, total to fill her palm  Will check CBC today to see if she needs another transfusion and start [address] [place] [address] 3 tabs/day for one week to get control of bleeding, then transition to DepoLupron with add back for 3-4 months  May need EMBX, possible SIS to better evaluate lining of uterus  Patient desires future fertility  All questions answered  Patient heading to lab shortly  Rx sent to [facility]       Plan  Abnormal uterine bleeding (AUB), Heavy menstrual bleeding, Uterine fibroid    · Lupron Depot 3 75 MG Intramuscular Kit; USE AS DIRECTED   · Norethindrone Acetate 5 MG Oral Tablet; TAKE 1 TABLET DAILY    Signatures   Electronically signed by : Trista Castro DO;  Apr 25 2017 10:01AM EST                       (Author)

## 2018-01-14 VITALS
HEART RATE: 124 BPM | BODY MASS INDEX: 24.63 KG/M2 | RESPIRATION RATE: 16 BRPM | WEIGHT: 139 LBS | SYSTOLIC BLOOD PRESSURE: 108 MMHG | DIASTOLIC BLOOD PRESSURE: 72 MMHG | HEIGHT: 63 IN

## 2018-01-16 NOTE — MISCELLANEOUS
Message   Recorded as Task   Date: 07/14/2016 11:49 AM, Created By: Rj Gatica   Task Name: Medical Complaint Callback   Assigned To: Seema Boston   Regarding Patient: Salvador Bonner, Status: Active   Comment:    Sarkis Casey - 14 Jul 2016 11:49 AM     TASK CREATED  Caller: Self; Medical Complaint; [phone] (Home); [phone] (Work)  patient would like to speak to you about her bleeding problems  says she wants to take the pill you mentioned before and also would like to take a BC pill so she only gets her period every 3 months  is currently bleeding now and wasnt due for another 2 days  also states her period is heavy when she has is and bleeds through  spoke with patient  frustrated with bleeding patterns since birth of her child  bleeding is heavy, has break through bleeding and periods come early  Discussed options  Does not want a mirena  interested in pills that can help her skip her period and only have a period every 3 months  Will Rx 3 month pill  Instructed to take 4 weeks, then no pills for one week, then finish pack (8 weeks)  next pack can take straight through  All questions answered        Plan  Abnormal uterine bleeding (AUB), Contraception management    · Levonorgest-Eth Estrad 91-Day 0 15-0 03 &0 01 MG Oral Tablet; TAKE 1 TABLET  DAILY    Signatures   Electronically signed by : Aminah Ingram DO; Jul 14 4745  5:01PM EST                       (Author)

## 2018-01-16 NOTE — MISCELLANEOUS
Message   Recorded as Task   Date: 04/17/2017 09:01 AM, Created By: Miryam Tinajero   Task Name: Miscellaneous   Assigned To: Carmela Flores   Regarding Patient: Deepika Benton, Status: Active   Comment:    Sarkis Casey - 17 Apr 2017 9:01 AM     TASK CREATED  Caller: Self; Other; [phone] (Home); [phone] (Work)  patient called and wanted to let you know she is still bleeding  Carmela Blues - 17 Apr 2017 11:28 AM     TASK EDITED  spoke to Kellie  Bleeding is a little less  Discussed starting back on Junel which she did well on in the past  She picked it up and will start today  Will tentatively schedule for [facility]  on May 16 as we are unable to control her bleeding at this time  ? fibroid as source? has not changed significantly in size  pre-op/fu schedule may 1  Signatures   Electronically signed by : Huyen Machuca DO;  Apr 17 2017 11:29AM EST                       (Author)

## 2018-01-17 NOTE — PROGRESS NOTES
Chief Complaint  Here for Lupron given RIGHT buttock without difficulty  Will call when she decides if she will continue so we can start the process of getting medication  Advised her to take the add back med as prescribed  Active Problems    1  Abnormal uterine bleeding (AUB) (626 9) (N93 9)   2  Heavy menstrual bleeding (626 2) (N92 0)   3  Uterine fibroid (218 9) (D25 9)   4  Uterine leiomyoma (218 9) (D25 9)   5  Vitamin D deficiency (268 9) (E55 9)   6  Well female exam with routine gynecological exam (V72 31) (Z01 419)    Current Meds   1  Lupron Depot 3 75 MG Intramuscular Kit; USE AS DIRECTED; Therapy: 05Imm1586 to (Last Rx:25Apr2017)  Requested for: 18Rxf3130 Ordered   2  Norethindrone Acetate 5 MG Oral Tablet; TAKE 1 TABLET DAILY; Therapy: 31Tdc5630 to (Evaluate:24Jun2017)  Requested for: 38Xjr3257; Last   Rx:00Jxe9205 Ordered   3  Sprintec 28 0 25-35 MG-MCG Oral Tablet; 3 tablets daily for one week then stop; Therapy: 85Xbe0348 to (Last Rx:25Apr2017)  Requested for: 27Ftg1314 Ordered   4  Vitamin D TABS; Therapy: (Recorded:05Apr2017) to Recorded    Allergies    1  No Known Drug Allergies    2  No Known Environmental Allergies   3  No Known Food Allergies    Vitals  Signs    Respiration: 16  Systolic: 98  Diastolic: 72  Height: 5 ft 3 in    Assessment    1  Abnormal uterine bleeding (AUB) (626 9) (N93 9)   2   Uterine fibroid (218 9) (D25 9)    Plan  Abnormal uterine bleeding (AUB), Uterine fibroid, Uterine leiomyoma    · Lupron Depot 3 75 MG Intramuscular Kit    Signatures   Electronically signed by : Katie Salinas, ; May  3 2017  2:32PM EST                       (Co-author)    Electronically signed by : Nathan Bronson DO; May 18 3275 10:00AM EST                       (Author)

## 2018-01-17 NOTE — PROGRESS NOTES
Assessment    1  Well woman exam with routine gynecological exam (V72 31) (Z01 419)   2  Contraceptive surveillance (V25 40) (Z30 40)   3  Abnormal uterine bleeding (AUB) (626 9) (N93 9)    Plan  Well woman exam with routine gynecological exam    · Always use a seat belt and shoulder strap when riding or driving a motor vehicle ;  Status:Complete;   Done: 01PVX6554 01:41PM   Ordered; For:Well woman exam with routine gynecological exam; Ordered By:Malika Rojas;   · Begin a limited exercise program ; Status:Complete;   Done: 22UAX3877 01:41PM   Ordered; For:Well woman exam with routine gynecological exam; Ordered By:Malika Rojas;   · Begin or continue regular aerobic exercise  Gradually work up to at least 3 sessions of 30  minutes of exercise a week ; Status:Complete;   Done: 89WXT6090 01:41PM   Ordered; For:Well woman exam with routine gynecological exam; Ordered By:Malika Rojas;   · Brush your teeth 3 times a day and floss at least once a day ; Status:Complete;   Done:  34GOF6116 01:41PM   Ordered; For:Well woman exam with routine gynecological exam; Ordered By:Malika Rojas;   · Drink plenty of fluids ; Status:Complete;   Done: 02UHM0256 01:41PM   Ordered; For:Well woman exam with routine gynecological exam; Ordered By:Malika Rojas;   · Keep a diary of when and what you eat ; Status:Complete;   Done: 51DMN6993 01:41PM   Ordered; For:Well woman exam with routine gynecological exam; Ordered By:Malika Rojas;   · Limit your use of alcohol to 2 drinks or cans of beer a day ; Status:Complete;   Done:  21SCD3338 01:41PM   Ordered; For:Well woman exam with routine gynecological exam; Ordered By:Malika Rojas;   · Some eating tips that can help you lose weight ; Status:Complete;   Done: 73LFT5160  01:41PM   Ordered;   For:Well woman exam with routine gynecological exam; Ordered By:Malika Rojas;   · There are many ways to reduce your risk of catching or spreading a sexually transmitted  Infection ; Status:Complete;   Done: 64LDK7537 01:41PM   Ordered; For:Well woman exam with routine gynecological exam; Ordered By:Malika Rojas;   · Use a sun block product with an SPF of 15 or more ; Status:Complete;   Done:  87DIC6456 01:41PM   Ordered; For:Well woman exam with routine gynecological exam; Ordered By:Malika Rojas;   · Vitamins can help you get daily requirements that your diet may not be giving you ;  Status:Complete;   Done: 83ZWU5120 01:41PM   Ordered; For:Well woman exam with routine gynecological exam; Ordered By:Maliak Rojas;   · We encourage all of our patients to exercise regularly  30 minutes of exercise or physical  activity five or more days a week is recommended for children and adults ;  Status:Complete;   Done: 85HUG3065 01:41PM   Ordered; For:Well woman exam with routine gynecological exam; Ordered By:Malika Rojas;   · We recommend routine visits to a dentist ; Status:Complete;   Done: 33BYD6182  01:41PM   Ordered; For:Well woman exam with routine gynecological exam; Ordered By:Malika Rojas;   · Follow-up visit in 1 year Evaluation and Treatment  Follow-up  Status: Hold For -  Scheduling  Requested for: 91CFQ4856   Ordered; For: Well woman exam with routine gynecological exam; Ordered By: Rick Mercado Performed:  Due: 07WGK6273   · (1) THIN PREP PAP FOLLOW UP WITH IMAGING; Status: In Progress - Specimen/Data  Collected;   Done: 41UZR3814   Perform:[facility]; NKM:97OZF0993;BPPKBMA; For:Well woman exam with routine gynecological exam; Ordered By:Malika Rojas; Maturation index required? : Yes  Date? : 11Jan2016  HPV? : if ASCUS    Discussion/Summary  healthy adult female Currently, she eats an adequate diet and has an adequate exercise regimen   the risks and benefits of cervical cancer screening were discussed cervical cancer screening is needed every three years HPV and Pap Co-testing Done Today Breast cancer screening: the risks and benefits of breast cancer screening were discussed and monthly self breast exam was advised  Advice and education were given regarding nutrition, aerobic exercise, weight loss, reproductive health and contraception  Patient discussion: discussed with the patient  Chief Complaint  pt here for yearly, has no complaints      History of Present Illness  GYN HM, Adult Female [facility]: The patient is being seen for a gynecology and yearly evaluation  The last health maintenance visit was 1 year(s) ago  General Health: The patient's health since the last visit is described as good  Immunizations status: up to date  Lifestyle:  She consumes a diverse and healthy diet  She does not have any weight concerns  She does not exercise regularly  She does not use tobacco  She denies alcohol use  She denies drug use  Reproductive health: the patient is premenopausal   she reports menstrual problems  Menstrual history: LMP: the last menstrual period was 01/11/2016  Recent menstrual periods: bleeding has been normal  The cycles have been regular  The duration of her recent periods has been regular  she uses contraception  For contraception, she uses oral contraception pills  she is sexually active  pregnancy history: G 2P 2  Screening: Cervical cancer screening includes uncertain timing of her last pap smear, uncertain timing of her last human papilloma virus screening and no previous colposcopy  Breast cancer screening includes no previous mammogram, a clinical breast exam performed last year and irregular breast self-exams performed  Colorectal cancer screening includes no previous colonoscopy, no previous fecal occult blood testing and no previous flexible sigmoidoscopy  She hasn't been previously screened for colorectal cancer  Additional History:  bleeding has been better  still breast feeding  started with some brownish discharge today   discussed time sensitive nature of markus  Patient plans to breast feed at least 9 months  Will call when ready for regular OCPS  Review of Systems    Constitutional: not feeling poorly and not feeling tired  Breasts: breastfeeding, but no complaints of breast pain, breast lump or nipple discharge  Gastrointestinal: no complaints of abdominal pain, no constipation, no nausea or diarrhea, no vomiting, no bloody stools  Genitourinary: no complaints of dysuria, no incontinence, no pelvic pain, no dysmenorrhea, no vaginal discharge or abnormal vaginal bleeding  Neurological: no headache  ROS reviewed  OB History  Pregnancy History (Brief):   Prior pregnancies: : 2  Para: 2 (living)   Delivery type: 1 vaginal, 1  section       Active Problems    1  Abnormal uterine bleeding (AUB) (626 9) (N93 9)   2  Fibroid (218 9) (D25 9)   3  Vitamin D deficiency (268 9) (E55 9)    Past Medical History    · History of Acute viral pharyngitis (462) (J02 9)   · History of Arthritis (V13 4)   · History of Blood type, Rh negative (V49 89) (Z67 91)   · History of Breech presentation (652 20) (O32 1XX0)   · Denied: History of Carrier Of STD   · History of Encounter for routine postpartum follow-up (V24 2) (Z39 2)   · History of Gastroenteritis (558 9) (K52 9)   · History of General counseling and advice for contraceptive management (V25 09)  (Z30 09)   · History of pregnancy (V13 29)   · History of urinary tract infection (V13 02) (Z87 440)   · History of Infected Superficial Foot Injury (917 9)   · History of Influenza vaccine needed (V04 81) (Z23)   · Denied: History of Mental Status Change   · History of Uterine fibroid in pregnancy (654 10,218 9) (O34 10,D25 9)    The active problems and past medical history were reviewed and updated today        Surgical History    · History of Tonsillectomy    Family History    · Family history of Family Health Status 1  Children Living    Social History    · Denied: History of Alcohol Use (History)   · Denied: History of Exercising Regularly   · Marital History - Currently    · Never A Smoker   · Never Used Drugs   · Occupation:   · EMPLOYED AT [address] [place] [address]    Current Meds   1  Sandra 0 35 MG Oral Tablet; Therapy: (Recorded:32Gqv5131) to Recorded    Allergies    1  No Known Drug Allergies    2  No Known Environmental Allergies   3  No Known Food Allergies    Vitals   Recorded: 18RDO8689 01:15PM   Heart Rate 73   Systolic 584   Diastolic 58   Height 5 ft 4 in   Weight 151 lb    BMI Calculated 25 92   BSA Calculated 1 74   LMP 11Jan2016   Pain Scale 0     Physical Exam    Constitutional   General appearance: No acute distress, well appearing and well nourished  Neck   Neck: Normal, supple, trachea midline, no masses  Thyroid: Normal, no thyromegaly  Pulmonary   Respiratory effort: No increased work of breathing or signs of respiratory distress  Auscultation of lungs: Clear to auscultation  Cardiovascular   Auscultation of heart: Normal rate and rhythm, normal S1 and S2, no murmurs  Genitourinary   External genitalia: Normal and no lesions appreciated  Vagina: Normal, no lesions or dryness appreciated  Urethra: Normal     Urethral meatus: Normal     Bladder: Normal, soft, non-tender and no prolapse or masses appreciated  Cervix: Normal, no palpable masses  Uterus: Abnormal   (large fibroid palpable, right side of uterus) The uterus was enlarged 12 weeks  Adnexa/parametria: Normal, non-tender and no fullness or masses appreciated  Chest   Breasts: Normal and no dimpling or skin changes noted  no palpable masses  Abdomen   Abdomen: Normal, non-tender, and no organomegaly noted  Liver and spleen: No hepatomegaly or splenomegaly  Examination for hernias: No hernias appreciated  Lymphatic   Palpation of lymph nodes in neck, axillae, groin and/or other locations: No lymphadenopathy or masses noted      Psychiatric   Orientation to person, place, and time: Normal     Mood and affect: Normal        Signatures   Electronically signed by : Jimenez eFldman DO; Feb 1 5218  1:42PM EST                       (Author)

## 2018-01-22 VITALS — RESPIRATION RATE: 16 BRPM | SYSTOLIC BLOOD PRESSURE: 98 MMHG | DIASTOLIC BLOOD PRESSURE: 72 MMHG | HEIGHT: 63 IN

## 2018-01-23 NOTE — PROGRESS NOTES
DEC 4 2017         RE: Jocy Knock                                To: Can Kaminski,   M D    MR#: [id number]                                   Garrick Angulo 25   : [address]: [id number]:TI Gonzalez [address] [place] [address] Macrus Davison   (Exam #: [id number])                           Fax: [phone]      The LMP of this 29year old,  G3, P2-0-0-2 patient was unknown, her   working KAELYN is 2018 and the current gestational age is 16 weeks 5   days by [address] [place] [address]  A sonographic examination was performed on DEC   4 2017 using real time equipment  The ultrasound examination was performed   using abdominal technique  The patient has a BMI of 23 9  Her blood   pressure today was 107/70  Earliest US on record:  10/31/2017  8w0d 2018   KAELYN Multiple   longitudinal and transverse sections revealed a burger intrauterine   pregnancy with the fetus in variable presentation  The placenta is   posterior in implantation, grade 0 in appearance  Cardiac motion was observed at 151 bpm       INDICATIONS      first trimester genetic screening      Exam Types      Level I   sequential screen      RESULTS      Fetus # 1 of 1   Variable presentation      MEASUREMENTS (* Included In Average GA)      CRL              6 5 cm        12 weeks 4 days*   Nuchal Trans    1 50 mm      THE AVERAGE GESTATIONAL AGE is 12 weeks 4 days +/- 7 days  FIBROIDS      Intramural myometrium fibroid located in the middle lower uterine segment,   measuring 10 8 x 12 8 x 13 2cm with a volume of 954 4cc  Color doppler   flow was not increased  The appearance was heterogeneous  ANATOMY COMMENTS      Anatomic detail is limited at this gestational age  The fetal cranium   appeared normal in shape and the nuchal translucency was normal in size   (1 5mm)  The nasal bone appears to be present    The intracranial anatomy   was unremarkable  Evaluation of the spine revealed no obvious evidence   for a neural tube defect  Anatomy of the fetal thorax appeared within   normal limits with a normal cardiac axis and first trimester three vessel   view  The cardiac rhythm was regular and documented with M-mode  Within   the abdomen, the stomach & bladder were visualized and the abdominal wall   appeared intact  A three vessel cord appears to be present  Active   movement of the fetal body & extremities was seen  There is no suspicion   of a subchorionic bleed  The placental cord insertion appeared normal      There is no suspicion of a uterine myoma  Free fluid is not seen in the   posterior cul-de-sac  ADNEXA      The left ovary appeared normal and measured 3 5 x 2 4 x 2 0 cm with a   volume of 8 8 cc  The right ovary appeared normal and measured 2 9 x 3 0 x   1 7 cm with a volume of 7 7 cc       AMNIOTIC FLUID         Largest Vertical Pocket = 3 0 cm   Amniotic Fluid: Normal      IMPRESSION      Patricio IUP   12 weeks and 4 days by this ultrasound  (KAELYN=2018)   12 weeks and 5 days by 1st Tri Sono  (KEALYN=2018)   Variable presentation   Regular fetal heart rate of 151 bpm   Posterior placenta      GENERAL COMMENT      Ms Yaneli Cruz is here for nuchal translucency  She has a known lower   uterine segment fibroid for several years for which she received Depot   Lupron this summer after being hospitalized for abnormal uterine bleeding   at which time she required a 6-unit transfusion of PRBCs (this is all per   her report)  She has a prior vaginal birth followed by a    delivery for malpresentation  Today there is a single live intrauterine pregnancy with size equivalent   to dates  No gross fetal anomalies were identified on limited views  Amniotic fluid is within normal limits  Nuchal translucency measures 1 5mm which is within normal limits for this   crown-rump length        There is a large lower uterine segment fibroid measuring approximately 11   x 13 cm  On real time scanning the fibroid is broad based with neither a   left or a right sided preponderance  Evaluation and Management:   The patient was counseled regarding the above findings  A total of 15   minutes were spent in this encounter with >50% of the time spent in   face-to-face counseling and in coordination of care  The limitations of ultrasound and aneuploidy screening were explained to   her  Genetic screening and diagnostic testing options were discussed with   her  Blood work will be drawn today for Sequential Screen  She should return in 8 weeks for anatomy survey and cervical length   screening  I strongly recommended consideration of influenza vaccination in pregnancy   however she declines  We discussed her large lower uterine segment fibroid today  If possible I   would like to review her records however per her verbal report it does not   appear that this fibroid has grown in size  Her vaginal birth took place   in the setting of a large lower uterine segment fibroid which indicates to   me that her birth was not obstructed by this fibroid  The fetal   malpresentation which was the indication for her last delivery may have   been influenced by the fibroid  We also discussed her history of prior  section  In a setting of   1 prior  deliveries, assessment of placental location is indicated   at the time of anatomy scan to assess her risk factors of abnormal   placentation  Increasing number of  deliveries confers an   increased risk of placenta accreta spectrum however this is dramatically   amplified in the setting of a complete placenta previa if present    We   reviewed her chance of a successful TOLAC (trial of labor after    section) which per the Naga Balbuena 12 (available at   [email or web address])   is estimated at 88 5% based on today's BMI factors; this calculator does   NOT take into account the fact that she has a large lower uterine segment   fibroid, however  I will attempt to review her operative note to assess   for candidacy for  as sometimes hysterotomy incisions are influenced   by fibroid location  At the conclusion of today's encounter, all questions were answered to her   satisfaction  Thank you very much for this kind referral and please do   not hesitate to contact me with any further questions or concerns  ADDENDUM 17: I reached out to Dr Jacklyn Starks regarding this patient's   prior hysterotomy from   Dr Jacklyn Starks does NOT feel this patient is a   candidate for TOLAC based on that her hysterotomy was more like a J shaped   hysterotomy  She is working on documentation on her end to reflect this   in the 2015 records however given that this ultrasound report may be more   accessible than the updating of stored records, and that there are   delivering timing implications this pregnancy (delivery timing is advised   36 weeks 0 days to 37 weeks 6 days), I have amended the above report to   reflect new clinical information and also updated timing recommendations  This information and recommendation will be conveyed to the patient (I   called her today to discuss hwever there was no answer) and has been   conveyed to her obstetrician (Dr Valentin Patient)  Please do not hesitate to   contact me with any further questions or concerns  -MLB Jordis Lieu, R D M S Lendon Gilford, M D     Maternal Fetal Medicine   Electronically signed 17 15:20

## 2018-01-24 VITALS
HEIGHT: 64 IN | WEIGHT: 139 LBS | SYSTOLIC BLOOD PRESSURE: 107 MMHG | DIASTOLIC BLOOD PRESSURE: 70 MMHG | BODY MASS INDEX: 23.73 KG/M2 | HEART RATE: 76 BPM

## 2018-01-26 ENCOUNTER — ROUTINE PRENATAL (OUTPATIENT)
Dept: PERINATAL CARE | Facility: CLINIC | Age: 33
End: 2018-01-26
Payer: COMMERCIAL

## 2018-01-26 VITALS
HEART RATE: 81 BPM | WEIGHT: 149 LBS | HEIGHT: 64 IN | SYSTOLIC BLOOD PRESSURE: 94 MMHG | DIASTOLIC BLOOD PRESSURE: 65 MMHG | BODY MASS INDEX: 25.44 KG/M2

## 2018-01-26 DIAGNOSIS — Z36.86 ENCOUNTER FOR ANTENATAL SCREENING FOR CERVICAL LENGTH: ICD-10-CM

## 2018-01-26 DIAGNOSIS — D25.9 UTERINE FIBROID IN PREGNANCY: Primary | ICD-10-CM

## 2018-01-26 DIAGNOSIS — Z36.3 ENCOUNTER FOR ANTENATAL SCREENING FOR MALFORMATIONS: ICD-10-CM

## 2018-01-26 DIAGNOSIS — Z98.891 HISTORY OF CESAREAN SECTION, CLASSICAL: ICD-10-CM

## 2018-01-26 DIAGNOSIS — O34.10 UTERINE FIBROID IN PREGNANCY: Primary | ICD-10-CM

## 2018-01-26 PROCEDURE — 76817 TRANSVAGINAL US OBSTETRIC: CPT | Performed by: OBSTETRICS & GYNECOLOGY

## 2018-01-26 PROCEDURE — 76805 OB US >/= 14 WKS SNGL FETUS: CPT | Performed by: OBSTETRICS & GYNECOLOGY

## 2018-01-26 PROCEDURE — 99212 OFFICE O/P EST SF 10 MIN: CPT | Performed by: OBSTETRICS & GYNECOLOGY

## 2018-01-26 NOTE — PROGRESS NOTES
Maternal-Fetal Medicine:  Ms Cathy Martinez was seen today in the [address] today for anatomic survey and cervical length screening ultrasound  Please see ultrasound report under "OB Procedures" tab in EPIC   Thank you for the referral and please don't hesitate to contact our office with any concerns or questions      Sincerely,    Edgard Thayer MD  Attending Physician, Maternal-Fetal Medicine

## 2018-03-23 ENCOUNTER — ULTRASOUND (OUTPATIENT)
Dept: PERINATAL CARE | Facility: CLINIC | Age: 33
End: 2018-03-23
Payer: COMMERCIAL

## 2018-03-23 VITALS
SYSTOLIC BLOOD PRESSURE: 101 MMHG | HEART RATE: 84 BPM | HEIGHT: 64 IN | BODY MASS INDEX: 27.06 KG/M2 | WEIGHT: 158.5 LBS | DIASTOLIC BLOOD PRESSURE: 67 MMHG

## 2018-03-23 DIAGNOSIS — D25.9 UTERINE FIBROID IN PREGNANCY: Primary | ICD-10-CM

## 2018-03-23 DIAGNOSIS — Z3A.28 28 WEEKS GESTATION OF PREGNANCY: ICD-10-CM

## 2018-03-23 DIAGNOSIS — Z98.891 HISTORY OF CESAREAN SECTION, CLASSICAL: ICD-10-CM

## 2018-03-23 DIAGNOSIS — O34.10 UTERINE FIBROID IN PREGNANCY: Primary | ICD-10-CM

## 2018-03-23 PROCEDURE — 76816 OB US FOLLOW-UP PER FETUS: CPT | Performed by: OBSTETRICS & GYNECOLOGY

## 2018-03-23 PROCEDURE — 99212 OFFICE O/P EST SF 10 MIN: CPT | Performed by: OBSTETRICS & GYNECOLOGY

## 2018-03-24 ENCOUNTER — TRANSCRIBE ORDERS (OUTPATIENT)
Dept: ADMINISTRATIVE | Age: 33
End: 2018-03-24

## 2018-03-24 ENCOUNTER — APPOINTMENT (OUTPATIENT)
Dept: LAB | Age: 33
End: 2018-03-24
Payer: COMMERCIAL

## 2018-03-24 DIAGNOSIS — O36.0130 MATERNAL CARE FOR ANTI-D (RH) ANTIBODIES IN THIRD TRIMESTER, SINGLE OR UNSPECIFIED FETUS: Primary | ICD-10-CM

## 2018-03-24 DIAGNOSIS — Z34.83 PRENATAL CARE, SUBSEQUENT PREGNANCY, THIRD TRIMESTER: ICD-10-CM

## 2018-03-24 DIAGNOSIS — O36.0130 MATERNAL CARE FOR ANTI-D (RH) ANTIBODIES IN THIRD TRIMESTER, SINGLE OR UNSPECIFIED FETUS: ICD-10-CM

## 2018-03-24 LAB
GLUCOSE 1H P 50 G GLC PO SERPL-MCNC: 119 MG/DL
HCT VFR BLD AUTO: 33 % (ref 34.8–46.1)
HGB BLD-MCNC: 10.7 G/DL (ref 11.5–15.4)

## 2018-03-24 PROCEDURE — 36415 COLL VENOUS BLD VENIPUNCTURE: CPT

## 2018-03-24 PROCEDURE — 82950 GLUCOSE TEST: CPT

## 2018-03-24 PROCEDURE — 86901 BLOOD TYPING SEROLOGIC RH(D): CPT

## 2018-03-24 PROCEDURE — 85014 HEMATOCRIT: CPT

## 2018-03-24 PROCEDURE — 85018 HEMOGLOBIN: CPT

## 2018-03-24 PROCEDURE — 86850 RBC ANTIBODY SCREEN: CPT

## 2018-03-24 PROCEDURE — 86900 BLOOD TYPING SEROLOGIC ABO: CPT

## 2018-03-24 NOTE — PROGRESS NOTES
[address]: Ms Puneet Welch was seen today at 28w3d for fetal growth assessment ultrasound  See ultrasound report under "OB Procedures" tab    Please don't hesitate to contact our office with any concerns or questions   -Niko Johnson MD

## 2018-03-25 LAB
ABO GROUP BLD: NORMAL
BLD GP AB SCN SERPL QL: NEGATIVE
RH BLD: NEGATIVE

## 2018-05-04 ENCOUNTER — ULTRASOUND (OUTPATIENT)
Dept: PERINATAL CARE | Facility: CLINIC | Age: 33
End: 2018-05-04
Payer: COMMERCIAL

## 2018-05-04 VITALS
HEART RATE: 86 BPM | BODY MASS INDEX: 28.24 KG/M2 | WEIGHT: 165.4 LBS | DIASTOLIC BLOOD PRESSURE: 74 MMHG | SYSTOLIC BLOOD PRESSURE: 114 MMHG | HEIGHT: 64 IN

## 2018-05-04 DIAGNOSIS — O34.13 UTERINE FIBROIDS AFFECTING PREGNANCY, THIRD TRIMESTER: Primary | ICD-10-CM

## 2018-05-04 DIAGNOSIS — D25.9 UTERINE FIBROIDS AFFECTING PREGNANCY, THIRD TRIMESTER: Primary | ICD-10-CM

## 2018-05-04 DIAGNOSIS — Z98.891 HISTORY OF CESAREAN SECTION, CLASSICAL: ICD-10-CM

## 2018-05-04 DIAGNOSIS — Z3A.34 34 WEEKS GESTATION OF PREGNANCY: ICD-10-CM

## 2018-05-04 PROBLEM — O34.10 UTERINE FIBROID IN PREGNANCY: Status: RESOLVED | Noted: 2018-01-26 | Resolved: 2018-05-04

## 2018-05-04 PROCEDURE — 99212 OFFICE O/P EST SF 10 MIN: CPT | Performed by: OBSTETRICS & GYNECOLOGY

## 2018-05-04 PROCEDURE — 76816 OB US FOLLOW-UP PER FETUS: CPT | Performed by: OBSTETRICS & GYNECOLOGY

## 2018-05-04 NOTE — PROGRESS NOTES
Please refer to the [facility] ultrasound report in Ob Procedures for additional information regarding the visit to the [facility]  today

## 2018-05-07 PROCEDURE — 87653 STREP B DNA AMP PROBE: CPT | Performed by: OBSTETRICS & GYNECOLOGY

## 2018-05-09 ENCOUNTER — LAB REQUISITION (OUTPATIENT)
Dept: LAB | Facility: HOSPITAL | Age: 33
End: 2018-05-09
Payer: COMMERCIAL

## 2018-05-09 DIAGNOSIS — Z34.83 ENCOUNTER FOR SUPERVISION OF OTHER NORMAL PREGNANCY, THIRD TRIMESTER: ICD-10-CM

## 2018-05-10 LAB — GP B STREP DNA SPEC QL NAA+PROBE: NORMAL

## 2018-05-22 ENCOUNTER — ANESTHESIA EVENT (INPATIENT)
Dept: LABOR AND DELIVERY | Facility: HOSPITAL | Age: 33
End: 2018-05-22
Payer: COMMERCIAL

## 2018-05-22 NOTE — H&P
H&P Exam - Obstetrics   Delores Houston 29 y o  female MRN: [id number]  Unit/Bed#:  Encounter: [id number]    Assessment/Plan     Assessment:  28 yo  with one prior classical c/s for large fibroid and breech, presents at 37 0/7 weeks for repeat c/s  EDC 18 by 8 0/7 wk US on 10/31/17  Plan:  Admit to L&D for repeat c/s  Type and crossed for 2 units  Pt aware of increased risks of hemorrhage, injury to neighboring tissue, need for blood transfusion or hysterectomy  Pt aware we do not plan to address removal of fibroid intraoperatively  History of Present Illness   Chief Complaint: repeat c/s; prior classical c/s    HPI:  Delores Houston is a 29 y o   female with an KAELYN of 2018, by Other Basis at 40 0d weeks gestation who is being admitted for repeat c/s  Her current obstetrical history is significant for prior classical  for breech and large anterior fibroid  Pt again has known, large, anterior lower uterine segment fibroid 13 5 x 11 4 x 11 5cm  EFW 2325gm 5lb 2oz on 18  Pregnancy also significant for Rh neg, s/p rhogam 18, pt and FOB Citizen of [place], Hb electrophoresis AA x 2; 28 week H/H 10/7/33 0-pt supplementing iron/vit c  Contractions: None  Leakage of fluid: None  Bleeding: None  Fetal movement: present  Pregnancy complications: see HPI  Review of Systems  Pt denies chest pain, dizziness, admits pelvic pressure    Historical Information   OB History    Para Term  AB Living   3 2 2 0 0 2   SAB TAB Ectopic Multiple Live Births           2      # Outcome Date GA Lbr Zheng/2nd Weight Sex Delivery Anes PTL Lv   3             2 Term 07/08/15 39w0d  3402 g (7 lb 8 oz) M CS-Unspec Spinal N SHANDA      Complications: Breech delivery   1 Term 13 39w4d  3856 g (8 lb 8 oz) M Vag-Forceps EPI N SHANDA        Baby complications/comments: vertex  Past Medical History:   Diagnosis Date    Anemia     Fibroid     Varicella     Visual impairment Past Surgical History:   Procedure Laterality Date     SECTION      TONSILLECTOMY      WISDOM TOOTH EXTRACTION       Social History   History   Alcohol Use No     History   Drug Use No     History   Smoking Status    Never Smoker   Smokeless Tobacco    Never Used     Family History: negative    Meds/Allergies   PNV, iron, vitamin C daily  No Known Allergies    Objective   Vitals: unknown if currently breastfeeding  There is no height or weight on file to calculate BMI  Invasive Devices          No matching active lines, drains, or airways          Physical Exam  98/68 Ht 64"  Wt 169 P 80 R12  Neck supple  Lungs clear  Abdomen gravid, FH 40cm at 36 wks, soft  Pelvic deferred  Ext no edema    Prenatal Labs: I have personally reviewed pertinent reports  Imaging, EKG, Pathology, and Other Studies: I have personally reviewed pertinent reports

## 2018-05-23 ENCOUNTER — ANESTHESIA (INPATIENT)
Dept: LABOR AND DELIVERY | Facility: HOSPITAL | Age: 33
End: 2018-05-23
Payer: COMMERCIAL

## 2018-05-23 ENCOUNTER — APPOINTMENT (INPATIENT)
Dept: RADIOLOGY | Facility: HOSPITAL | Age: 33
End: 2018-05-23
Payer: COMMERCIAL

## 2018-05-23 ENCOUNTER — HOSPITAL ENCOUNTER (INPATIENT)
Facility: HOSPITAL | Age: 33
LOS: 4 days | Discharge: HOME | End: 2018-05-27
Attending: OBSTETRICS & GYNECOLOGY | Admitting: OBSTETRICS & GYNECOLOGY
Payer: COMMERCIAL

## 2018-05-23 DIAGNOSIS — D25.9 UTERINE FIBROIDS AFFECTING PREGNANCY, THIRD TRIMESTER: ICD-10-CM

## 2018-05-23 DIAGNOSIS — O34.13 UTERINE FIBROIDS AFFECTING PREGNANCY, THIRD TRIMESTER: ICD-10-CM

## 2018-05-23 DIAGNOSIS — Z98.891 HISTORY OF CESAREAN SECTION, CLASSICAL: Primary | ICD-10-CM

## 2018-05-23 PROBLEM — Z34.90 TERM PREGNANCY: Status: ACTIVE | Noted: 2018-05-23

## 2018-05-23 LAB
ABO GROUP BLD: NORMAL
ABO GROUP BLD: NORMAL
BASE EXCESS BLDA CALC-SCNC: -1 MMOL/L (ref -2–3)
BASE EXCESS BLDCOV CALC-SCNC: -1.2 MMOL/L (ref 1–9)
BLD GP AB SCN SERPL QL: NEGATIVE
BLD GP AB SCN SERPL QL: POSITIVE
BLOOD GROUP ANTIBODIES SERPL: NORMAL
CA-I BLD-SCNC: 1.17 MMOL/L (ref 1.12–1.32)
ERYTHROCYTE [DISTWIDTH] IN BLOOD BY AUTOMATED COUNT: 14.4 % (ref 11.6–15.1)
ERYTHROCYTE [DISTWIDTH] IN BLOOD BY AUTOMATED COUNT: 14.6 % (ref 11.6–15.1)
FETAL CELL SCN BLD QL ROSETTE: NEGATIVE
GLUCOSE SERPL-MCNC: 99 MG/DL (ref 65–140)
HCO3 BLDA-SCNC: 24.5 MMOL/L (ref 24–30)
HCO3 BLDCOV-SCNC: 24.4 MMOL/L (ref 12.2–28.6)
HCT VFR BLD AUTO: 25.7 % (ref 34.8–46.1)
HCT VFR BLD AUTO: 35.3 % (ref 34.8–46.1)
HCT VFR BLD CALC: 29 % (ref 34.8–46.1)
HGB BLD-MCNC: 11.7 G/DL (ref 11.5–15.4)
HGB BLD-MCNC: 8.6 G/DL (ref 11.5–15.4)
HGB BLDA-MCNC: 9.9 G/DL (ref 11.5–15.4)
MCH RBC QN AUTO: 27.1 PG (ref 26.8–34.3)
MCH RBC QN AUTO: 27.5 PG (ref 26.8–34.3)
MCHC RBC AUTO-ENTMCNC: 33.1 G/DL (ref 31.4–37.4)
MCHC RBC AUTO-ENTMCNC: 33.5 G/DL (ref 31.4–37.4)
MCV RBC AUTO: 82 FL (ref 82–98)
MCV RBC AUTO: 82 FL (ref 82–98)
OXYHGB MFR BLDCOV: 83.3 %
PCO2 BLD: 26 MMOL/L (ref 21–32)
PCO2 BLD: 41.9 MM HG (ref 42–50)
PCO2 BLDCOV: 43.9 MM HG (ref 27–43)
PH BLD: 7.38 [PH] (ref 7.3–7.4)
PH BLDCOV: 7.36 [PH] (ref 7.19–7.49)
PLATELET # BLD AUTO: 161 THOUSANDS/UL (ref 149–390)
PLATELET # BLD AUTO: 193 THOUSANDS/UL (ref 149–390)
PMV BLD AUTO: 9.3 FL (ref 8.9–12.7)
PMV BLD AUTO: 9.9 FL (ref 8.9–12.7)
PO2 BLD: 19 MM HG (ref 35–45)
PO2 BLDCOV: 39.3 MM HG (ref 15–45)
POTASSIUM BLD-SCNC: 3.3 MMOL/L (ref 3.5–5.3)
RBC # BLD AUTO: 3.13 MILLION/UL (ref 3.81–5.12)
RBC # BLD AUTO: 4.32 MILLION/UL (ref 3.81–5.12)
RH BLD: NEGATIVE
RH BLD: NEGATIVE
SAO2 % BLD FROM PO2: 28 % (ref 95–98)
SAO2 % BLDCOV: 16.8 ML/DL
SODIUM BLD-SCNC: 137 MMOL/L (ref 136–145)
SPECIMEN EXPIRATION DATE: NORMAL
SPECIMEN SOURCE: ABNORMAL
WBC # BLD AUTO: 11.72 THOUSAND/UL (ref 4.31–10.16)
WBC # BLD AUTO: 7.78 THOUSAND/UL (ref 4.31–10.16)

## 2018-05-23 PROCEDURE — 85461 HEMOGLOBIN FETAL: CPT | Performed by: OBSTETRICS & GYNECOLOGY

## 2018-05-23 PROCEDURE — 86592 SYPHILIS TEST NON-TREP QUAL: CPT | Performed by: OBSTETRICS & GYNECOLOGY

## 2018-05-23 PROCEDURE — 86850 RBC ANTIBODY SCREEN: CPT | Performed by: OBSTETRICS & GYNECOLOGY

## 2018-05-23 PROCEDURE — 85027 COMPLETE CBC AUTOMATED: CPT | Performed by: OBSTETRICS & GYNECOLOGY

## 2018-05-23 PROCEDURE — 86870 RBC ANTIBODY IDENTIFICATION: CPT | Performed by: OBSTETRICS & GYNECOLOGY

## 2018-05-23 PROCEDURE — 86900 BLOOD TYPING SEROLOGIC ABO: CPT | Performed by: OBSTETRICS & GYNECOLOGY

## 2018-05-23 PROCEDURE — 85014 HEMATOCRIT: CPT

## 2018-05-23 PROCEDURE — 86901 BLOOD TYPING SEROLOGIC RH(D): CPT | Performed by: OBSTETRICS & GYNECOLOGY

## 2018-05-23 PROCEDURE — 82330 ASSAY OF CALCIUM: CPT

## 2018-05-23 PROCEDURE — 86920 COMPATIBILITY TEST SPIN: CPT | Performed by: ANESTHESIOLOGY

## 2018-05-23 PROCEDURE — 82947 ASSAY GLUCOSE BLOOD QUANT: CPT

## 2018-05-23 PROCEDURE — 84295 ASSAY OF SERUM SODIUM: CPT

## 2018-05-23 PROCEDURE — 74018 RADEX ABDOMEN 1 VIEW: CPT

## 2018-05-23 PROCEDURE — 82803 BLOOD GASES ANY COMBINATION: CPT

## 2018-05-23 PROCEDURE — 82805 BLOOD GASES W/O2 SATURATION: CPT | Performed by: OBSTETRICS & GYNECOLOGY

## 2018-05-23 PROCEDURE — 84132 ASSAY OF SERUM POTASSIUM: CPT

## 2018-05-23 RX ORDER — BISACODYL 10 MG
10 SUPPOSITORY, RECTAL RECTAL DAILY PRN
Status: DISCONTINUED | OUTPATIENT
Start: 2018-05-23 | End: 2018-05-27 | Stop reason: HOSPADM

## 2018-05-23 RX ORDER — TRANEXAMIC ACID 100 MG/ML
INJECTION, SOLUTION INTRAVENOUS AS NEEDED
Status: DISCONTINUED | OUTPATIENT
Start: 2018-05-23 | End: 2018-05-23 | Stop reason: SURG

## 2018-05-23 RX ORDER — OXYCODONE HYDROCHLORIDE AND ACETAMINOPHEN 5; 325 MG/1; MG/1
1 TABLET ORAL EVERY 4 HOURS PRN
Status: DISCONTINUED | OUTPATIENT
Start: 2018-05-24 | End: 2018-05-27 | Stop reason: HOSPADM

## 2018-05-23 RX ORDER — ACETAMINOPHEN 325 MG/1
650 TABLET ORAL EVERY 6 HOURS PRN
Status: DISCONTINUED | OUTPATIENT
Start: 2018-05-23 | End: 2018-05-27 | Stop reason: HOSPADM

## 2018-05-23 RX ORDER — OXYTOCIN/RINGER'S LACTATE 30/500 ML
62.5 PLASTIC BAG, INJECTION (ML) INTRAVENOUS ONCE
Status: DISCONTINUED | OUTPATIENT
Start: 2018-05-23 | End: 2018-05-27 | Stop reason: HOSPADM

## 2018-05-23 RX ORDER — ONDANSETRON 2 MG/ML
4 INJECTION INTRAMUSCULAR; INTRAVENOUS EVERY 6 HOURS PRN
Status: DISCONTINUED | OUTPATIENT
Start: 2018-05-23 | End: 2018-05-27 | Stop reason: HOSPADM

## 2018-05-23 RX ORDER — SODIUM CHLORIDE 9 MG/ML
INJECTION, SOLUTION INTRAVENOUS CONTINUOUS PRN
Status: DISCONTINUED | OUTPATIENT
Start: 2018-05-23 | End: 2018-05-23

## 2018-05-23 RX ORDER — BUPIVACAINE HYDROCHLORIDE 7.5 MG/ML
INJECTION, SOLUTION INTRASPINAL AS NEEDED
Status: DISCONTINUED | OUTPATIENT
Start: 2018-05-23 | End: 2018-05-23 | Stop reason: SURG

## 2018-05-23 RX ORDER — DIPHENHYDRAMINE HCL 25 MG
25 TABLET ORAL EVERY 6 HOURS PRN
Status: DISCONTINUED | OUTPATIENT
Start: 2018-05-23 | End: 2018-05-27 | Stop reason: HOSPADM

## 2018-05-23 RX ORDER — PROMETHAZINE HYDROCHLORIDE 25 MG/ML
25 INJECTION, SOLUTION INTRAMUSCULAR; INTRAVENOUS EVERY 6 HOURS PRN
Status: DISCONTINUED | OUTPATIENT
Start: 2018-05-23 | End: 2018-05-27 | Stop reason: HOSPADM

## 2018-05-23 RX ORDER — PROPOFOL 10 MG/ML
INJECTION, EMULSION INTRAVENOUS AS NEEDED
Status: DISCONTINUED | OUTPATIENT
Start: 2018-05-23 | End: 2018-05-23 | Stop reason: SURG

## 2018-05-23 RX ORDER — OXYCODONE HYDROCHLORIDE AND ACETAMINOPHEN 5; 325 MG/1; MG/1
2 TABLET ORAL EVERY 4 HOURS PRN
Status: DISPENSED | OUTPATIENT
Start: 2018-05-23 | End: 2018-05-24

## 2018-05-23 RX ORDER — METOCLOPRAMIDE HYDROCHLORIDE 5 MG/ML
10 INJECTION INTRAMUSCULAR; INTRAVENOUS EVERY 6 HOURS PRN
Status: DISCONTINUED | OUTPATIENT
Start: 2018-05-23 | End: 2018-05-23

## 2018-05-23 RX ORDER — EPHEDRINE SULFATE 50 MG/ML
INJECTION, SOLUTION INTRAVENOUS AS NEEDED
Status: DISCONTINUED | OUTPATIENT
Start: 2018-05-23 | End: 2018-05-23 | Stop reason: SURG

## 2018-05-23 RX ORDER — METOCLOPRAMIDE HYDROCHLORIDE 5 MG/ML
10 INJECTION INTRAMUSCULAR; INTRAVENOUS EVERY 6 HOURS PRN
Status: DISCONTINUED | OUTPATIENT
Start: 2018-05-23 | End: 2018-05-27 | Stop reason: HOSPADM

## 2018-05-23 RX ORDER — MIDAZOLAM HYDROCHLORIDE 1 MG/ML
INJECTION INTRAMUSCULAR; INTRAVENOUS AS NEEDED
Status: DISCONTINUED | OUTPATIENT
Start: 2018-05-23 | End: 2018-05-23 | Stop reason: SURG

## 2018-05-23 RX ORDER — ONDANSETRON 2 MG/ML
INJECTION INTRAMUSCULAR; INTRAVENOUS AS NEEDED
Status: DISCONTINUED | OUTPATIENT
Start: 2018-05-23 | End: 2018-05-23 | Stop reason: SURG

## 2018-05-23 RX ORDER — SODIUM CHLORIDE 9 MG/ML
INJECTION, SOLUTION INTRAVENOUS CONTINUOUS PRN
Status: DISCONTINUED | OUTPATIENT
Start: 2018-05-23 | End: 2018-05-23 | Stop reason: SURG

## 2018-05-23 RX ORDER — SODIUM CHLORIDE, SODIUM LACTATE, POTASSIUM CHLORIDE, CALCIUM CHLORIDE 600; 310; 30; 20 MG/100ML; MG/100ML; MG/100ML; MG/100ML
125 INJECTION, SOLUTION INTRAVENOUS CONTINUOUS
Status: DISCONTINUED | OUTPATIENT
Start: 2018-05-23 | End: 2018-05-23

## 2018-05-23 RX ORDER — IBUPROFEN 600 MG/1
600 TABLET ORAL EVERY 6 HOURS PRN
Status: DISCONTINUED | OUTPATIENT
Start: 2018-05-23 | End: 2018-05-26

## 2018-05-23 RX ORDER — TRISODIUM CITRATE DIHYDRATE AND CITRIC ACID MONOHYDRATE 500; 334 MG/5ML; MG/5ML
30 SOLUTION ORAL ONCE
Status: COMPLETED | OUTPATIENT
Start: 2018-05-23 | End: 2018-05-23

## 2018-05-23 RX ORDER — PROMETHAZINE HYDROCHLORIDE 25 MG/ML
25 INJECTION, SOLUTION INTRAMUSCULAR; INTRAVENOUS EVERY 6 HOURS PRN
Status: DISCONTINUED | OUTPATIENT
Start: 2018-05-23 | End: 2018-05-23

## 2018-05-23 RX ORDER — MORPHINE SULFATE 1 MG/ML
INJECTION, SOLUTION EPIDURAL; INTRATHECAL; INTRAVENOUS AS NEEDED
Status: DISCONTINUED | OUTPATIENT
Start: 2018-05-23 | End: 2018-05-23 | Stop reason: SURG

## 2018-05-23 RX ORDER — OXYCODONE HYDROCHLORIDE AND ACETAMINOPHEN 5; 325 MG/1; MG/1
2 TABLET ORAL EVERY 4 HOURS PRN
Status: DISCONTINUED | OUTPATIENT
Start: 2018-05-24 | End: 2018-05-27 | Stop reason: HOSPADM

## 2018-05-23 RX ORDER — SODIUM CHLORIDE, SODIUM LACTATE, POTASSIUM CHLORIDE, CALCIUM CHLORIDE 600; 310; 30; 20 MG/100ML; MG/100ML; MG/100ML; MG/100ML
125 INJECTION, SOLUTION INTRAVENOUS CONTINUOUS
Status: DISCONTINUED | OUTPATIENT
Start: 2018-05-23 | End: 2018-05-25

## 2018-05-23 RX ORDER — KETAMINE HYDROCHLORIDE 50 MG/ML
INJECTION, SOLUTION, CONCENTRATE INTRAMUSCULAR; INTRAVENOUS AS NEEDED
Status: DISCONTINUED | OUTPATIENT
Start: 2018-05-23 | End: 2018-05-23 | Stop reason: SURG

## 2018-05-23 RX ORDER — SIMETHICONE 80 MG
80 TABLET,CHEWABLE ORAL 4 TIMES DAILY PRN
Status: DISCONTINUED | OUTPATIENT
Start: 2018-05-23 | End: 2018-05-27 | Stop reason: HOSPADM

## 2018-05-23 RX ORDER — FENTANYL CITRATE 50 UG/ML
INJECTION, SOLUTION INTRAMUSCULAR; INTRAVENOUS AS NEEDED
Status: DISCONTINUED | OUTPATIENT
Start: 2018-05-23 | End: 2018-05-23 | Stop reason: SURG

## 2018-05-23 RX ORDER — MORPHINE SULFATE 0.5 MG/ML
INJECTION, SOLUTION EPIDURAL; INTRATHECAL; INTRAVENOUS
Status: DISPENSED
Start: 2018-05-23 | End: 2018-05-23

## 2018-05-23 RX ORDER — MIDAZOLAM HYDROCHLORIDE 1 MG/ML
INJECTION INTRAMUSCULAR; INTRAVENOUS
Status: COMPLETED
Start: 2018-05-23 | End: 2018-05-23

## 2018-05-23 RX ORDER — METHYLERGONOVINE MALEATE 0.2 MG/ML
INJECTION INTRAVENOUS AS NEEDED
Status: DISCONTINUED | OUTPATIENT
Start: 2018-05-23 | End: 2018-05-23 | Stop reason: SURG

## 2018-05-23 RX ORDER — DOCUSATE SODIUM 100 MG/1
100 CAPSULE, LIQUID FILLED ORAL 2 TIMES DAILY
Status: DISCONTINUED | OUTPATIENT
Start: 2018-05-23 | End: 2018-05-27 | Stop reason: HOSPADM

## 2018-05-23 RX ORDER — SIMETHICONE 80 MG
80 TABLET,CHEWABLE ORAL EVERY 6 HOURS PRN
Status: DISCONTINUED | OUTPATIENT
Start: 2018-05-23 | End: 2018-05-27 | Stop reason: HOSPADM

## 2018-05-23 RX ORDER — FENTANYL CITRATE 50 UG/ML
INJECTION, SOLUTION INTRAMUSCULAR; INTRAVENOUS
Status: COMPLETED
Start: 2018-05-23 | End: 2018-05-23

## 2018-05-23 RX ADMIN — SODIUM CHLORIDE, SODIUM LACTATE, POTASSIUM CHLORIDE, AND CALCIUM CHLORIDE: .6; .31; .03; .02 INJECTION, SOLUTION INTRAVENOUS at 08:32

## 2018-05-23 RX ADMIN — TRANEXAMIC ACID 1000 MG: 100 INJECTION, SOLUTION INTRAVENOUS at 08:52

## 2018-05-23 RX ADMIN — SODIUM CHLORIDE, SODIUM LACTATE, POTASSIUM CHLORIDE, AND CALCIUM CHLORIDE 125 ML/HR: .6; .31; .03; .02 INJECTION, SOLUTION INTRAVENOUS at 15:04

## 2018-05-23 RX ADMIN — DOCUSATE SODIUM 100 MG: 100 CAPSULE, LIQUID FILLED ORAL at 17:01

## 2018-05-23 RX ADMIN — SODIUM CHLORIDE, SODIUM LACTATE, POTASSIUM CHLORIDE, AND CALCIUM CHLORIDE: .6; .31; .03; .02 INJECTION, SOLUTION INTRAVENOUS at 07:45

## 2018-05-23 RX ADMIN — FENTANYL CITRATE 100 MCG: 50 INJECTION, SOLUTION INTRAMUSCULAR; INTRAVENOUS at 09:39

## 2018-05-23 RX ADMIN — SODIUM CITRATE AND CITRIC ACID MONOHYDRATE 30 ML: 500; 334 SOLUTION ORAL at 07:30

## 2018-05-23 RX ADMIN — OXYTOCIN 250 MILLI-UNITS: 10 INJECTION, SOLUTION INTRAMUSCULAR; INTRAVENOUS at 08:39

## 2018-05-23 RX ADMIN — SODIUM CHLORIDE, SODIUM LACTATE, POTASSIUM CHLORIDE, AND CALCIUM CHLORIDE 1000 ML: .6; .31; .03; .02 INJECTION, SOLUTION INTRAVENOUS at 07:13

## 2018-05-23 RX ADMIN — OXYCODONE HYDROCHLORIDE AND ACETAMINOPHEN 2 TABLET: 5; 325 TABLET ORAL at 17:02

## 2018-05-23 RX ADMIN — KETAMINE HYDROCHLORIDE 25 MG: 50 INJECTION INTRAMUSCULAR; INTRAVENOUS at 10:06

## 2018-05-23 RX ADMIN — ONDANSETRON 4 MG: 2 INJECTION INTRAMUSCULAR; INTRAVENOUS at 18:05

## 2018-05-23 RX ADMIN — SIMETHICONE CHEW TAB 80 MG 80 MG: 80 TABLET ORAL at 23:11

## 2018-05-23 RX ADMIN — BUPIVACAINE HYDROCHLORIDE 1.6 ML: 7.5 INJECTION, SOLUTION INTRASPINAL at 07:59

## 2018-05-23 RX ADMIN — MIDAZOLAM 1 MG: 1 INJECTION INTRAMUSCULAR; INTRAVENOUS at 10:06

## 2018-05-23 RX ADMIN — SODIUM CHLORIDE, SODIUM LACTATE, POTASSIUM CHLORIDE, AND CALCIUM CHLORIDE 125 ML/HR: .6; .31; .03; .02 INJECTION, SOLUTION INTRAVENOUS at 23:13

## 2018-05-23 RX ADMIN — OXYCODONE HYDROCHLORIDE AND ACETAMINOPHEN 2 TABLET: 5; 325 TABLET ORAL at 21:35

## 2018-05-23 RX ADMIN — IBUPROFEN 600 MG: 600 TABLET ORAL at 15:04

## 2018-05-23 RX ADMIN — MORPHINE SULFATE 0.15 MG: 1 INJECTION, SOLUTION EPIDURAL; INTRATHECAL; INTRAVENOUS at 07:59

## 2018-05-23 RX ADMIN — SODIUM CHLORIDE: 0.9 INJECTION, SOLUTION INTRAVENOUS at 09:22

## 2018-05-23 RX ADMIN — SODIUM CHLORIDE, SODIUM LACTATE, POTASSIUM CHLORIDE, AND CALCIUM CHLORIDE: .6; .31; .03; .02 INJECTION, SOLUTION INTRAVENOUS at 09:50

## 2018-05-23 RX ADMIN — ONDANSETRON HYDROCHLORIDE 4 MG: 2 INJECTION, SOLUTION INTRAVENOUS at 09:12

## 2018-05-23 RX ADMIN — KETAMINE HYDROCHLORIDE 25 MG: 50 INJECTION INTRAMUSCULAR; INTRAVENOUS at 09:43

## 2018-05-23 RX ADMIN — IBUPROFEN 600 MG: 600 TABLET ORAL at 23:11

## 2018-05-23 RX ADMIN — OXYTOCIN: 10 INJECTION, SOLUTION INTRAMUSCULAR; INTRAVENOUS at 09:20

## 2018-05-23 RX ADMIN — SODIUM CHLORIDE: 0.9 INJECTION, SOLUTION INTRAVENOUS at 08:01

## 2018-05-23 RX ADMIN — PROPOFOL 50 MG: 10 INJECTION, EMULSION INTRAVENOUS at 10:21

## 2018-05-23 RX ADMIN — MIDAZOLAM 1 MG: 1 INJECTION INTRAMUSCULAR; INTRAVENOUS at 10:04

## 2018-05-23 RX ADMIN — TRANEXAMIC ACID 1000 MG: 100 INJECTION, SOLUTION INTRAVENOUS at 17:02

## 2018-05-23 RX ADMIN — CEFAZOLIN SODIUM 1000 MG: 1 SOLUTION INTRAVENOUS at 08:01

## 2018-05-23 RX ADMIN — EPHEDRINE SULFATE 10 MG: 50 INJECTION, SOLUTION INTRAMUSCULAR; INTRAVENOUS; SUBCUTANEOUS at 09:32

## 2018-05-23 RX ADMIN — SIMETHICONE CHEW TAB 80 MG 80 MG: 80 TABLET ORAL at 18:05

## 2018-05-23 RX ADMIN — SODIUM CHLORIDE, SODIUM LACTATE, POTASSIUM CHLORIDE, AND CALCIUM CHLORIDE 1000 ML: .6; .31; .03; .02 INJECTION, SOLUTION INTRAVENOUS at 06:14

## 2018-05-23 RX ADMIN — EPHEDRINE SULFATE 10 MG: 50 INJECTION, SOLUTION INTRAMUSCULAR; INTRAVENOUS; SUBCUTANEOUS at 08:17

## 2018-05-23 RX ADMIN — METHYLERGONOVINE MALEATE 0.2 MG: 0.2 INJECTION, SOLUTION INTRAMUSCULAR; INTRAVENOUS at 09:15

## 2018-05-23 RX ADMIN — DIPHENHYDRAMINE HCL 25 MG: 25 TABLET ORAL at 15:04

## 2018-05-23 NOTE — LACTATION NOTE
This note was copied from a baby's chart  Discussed 2nd night syndrome and ways to calm infant  Hand out given  Information on hand expression given  Discussed benefits of knowing how to manually express breast including stimulating milk supply, softening nipple for latch and evacuating breast in the event of engorgement  Met with mother  Provided mother with Ready, Set, Baby booklet  Discussed Skin to Skin contact an benefits to mom and baby  Talked about the delay of the first bath until baby has adjusted  Spoke about the benefits of rooming in  Feeding on cue and what that means for recognizing infant's hunger  Avoidance of pacifiers for the first month discussed  Talked about exclusive breastfeeding for the first 6 months  Positioning and latch reviewed as well as showing images of other feeding positions  Discussed the properties of a good latch in any position  Reviewed hand/manual expression  Discussed s/s that baby is getting enough milk and some s/s that breastfeeding dyad may need further help  Gave information on common concerns, what to expect the first few weeks after delivery, preparing for other caregivers, and how partners can help  Resources for support also provided  Encoraged MOB and FOB to call for assistance, questions and concerns  Extension number for inpatient lactation support provided

## 2018-05-23 NOTE — LACTATION NOTE
This note was copied from a baby's chart  Edel Fong says her first child was in the NICU formula fed for the first several hours of life  When she did latch it was hard to do and ended up having cracked nipples  This child seems to be latching very well  Luna started off cluster feeding for just minutes at a time  Currently breastfeeding for over fifteen minutes on left side  Discussed 2nd night syndrome and ways to calm infant  Hand out given  Met with mother  Provided mother with Ready, Set, Baby booklet  Discussed Skin to Skin contact an benefits to mom and baby  Talked about the delay of the first bath until baby has adjusted  Spoke about the benefits of rooming in  Feeding on cue and what that means for recognizing infant's hunger  Avoidance of pacifiers for the first month discussed  Talked about exclusive breastfeeding for the first 6 months  Positioning and latch reviewed as well as showing images of other feeding positions  Discussed the properties of a good latch in any position  Reviewed hand/manual expression  Discussed s/s that baby is getting enough milk and some s/s that breastfeeding dyad may need further help  Gave information on common concerns, what to expect the first few weeks after delivery, preparing for other caregivers, and how partners can help  Resources for support also provided  Discussed how to use a feeding log  Encouraged MOB to call for assistance, questions, and concerns about breastfeeding  Extension provided

## 2018-05-23 NOTE — LACTATION NOTE
This note was copied from a baby's chart  CONSULT - LACTATION  Baby Boy  Anita Perezdawi 0 days male MRN: [id number]    [address] [facility] NURSERY Room / Bed: L&D 312(N)/L&D 312(N) Encounter: [id number]    Maternal Information     MOTHER:  Estela Salcido  Maternal Age: 29 y o    OB History: #: 1, Date: None, Sex: None, Weight: None, GA: None, Delivery: None, Apgar1: None, Apgar5: None, Living: None, Birth Comments: None    #: 2, Date: 13, Sex: Male, Weight: 3856 g (8 lb 8 oz), GA: 39w4d, Delivery: Vaginal, Forceps, Apgar1: None, Apgar5: None, Living: Living, Birth Comments: None    #: 3, Date: 07/08/15, Sex: Male, Weight: 3402 g (7 lb 8 oz), GA: 39w0d, Delivery: , Unspecified, Apgar1: None, Apgar5: None, Living: Living, Birth Comments: None    #: 4, Date: 18, Sex: Male, Weight: 3005 g (6 lb 10 oz), GA: 37w0d, Delivery: , Classical, Apgar1: 8, Apgar5: 9, Living: Living, Birth Comments: None   Previouse breast reduction surgery? No    Lactation history:   Has patient previously breast fed: Yes   How long had patient previously breast fed: nine months each   Previous breast feeding complications: None     Past Surgical History:   Procedure Laterality Date     SECTION      PA  DELIVERY ONLY N/A 2018    Procedure:  SECTION (); Surgeon: Silvano Lopez MD;  Location: [facility];  Service: Obstetrics    TONSILLECTOMY      WISDOM TOOTH EXTRACTION         Birth information:  YOB: 2016   Time of birth: 8:38 AM   Sex: male   Delivery type: , Classical   Birth Weight: 3005 g (6 lb 10 oz)   Percent of Weight Change: 0%     Gestational Age: 37w0d   [unfilled]    Assessment     Breast and nipple assessment: not assessed at this time    [place] Assessment: not assessed at this time    Feeding assessment: not assessed at this time  LATCH:  Latch:      Audible Swallowing:     Type of Nipple:     Comfort (Breast/Nipple):     Hold (Positioning):     LATCH Score:            Feeding recommendations:  breast feed on demand    Brittany Bui RN 5/23/2018 6:42 PM

## 2018-05-23 NOTE — LACTATION NOTE
This note was copied from a baby's chart  CONSULT - LACTATION  Baby Boy  Monica Ripple) Shayna 0 days male MRN: [id number]    [address] [facility] NURSERY Room / Bed: L&D 312(N)/L&D 312(N) Encounter: [id number]    Maternal Information     MOTHER:  Hardy Farfan  Maternal Age: 29 y o    OB History: #: 1, Date: None, Sex: None, Weight: None, GA: None, Delivery: None, Apgar1: None, Apgar5: None, Living: None, Birth Comments: None    #: 2, Date: 13, Sex: Male, Weight: 3856 g (8 lb 8 oz), GA: 39w4d, Delivery: Vaginal, Forceps, Apgar1: None, Apgar5: None, Living: Living, Birth Comments: None    #: 3, Date: 07/08/15, Sex: Male, Weight: 3402 g (7 lb 8 oz), GA: 39w0d, Delivery: , Unspecified, Apgar1: None, Apgar5: None, Living: Living, Birth Comments: None    #: 4, Date: 18, Sex: Male, Weight: 3005 g (6 lb 10 oz), GA: 37w0d, Delivery: , Classical, Apgar1: 8, Apgar5: 9, Living: Living, Birth Comments: None   Previouse breast reduction surgery?  No    Lactation history:   Has patient previously breast fed: Yes   How long had patient previously breast fed: 1 year   Previous breast feeding complications: Breast/nipple pain, Infant separation     Past Surgical History:   Procedure Laterality Date     SECTION      TONSILLECTOMY      WISDOM TOOTH EXTRACTION         Birth information:  YOB: 2016   Time of birth: 8:38 AM   Sex: male   Delivery type: , Classical   Birth Weight: 3005 g (6 lb 10 oz)   Percent of Weight Change: 0%     Gestational Age: 37w0d   [unfilled]    Assessment     Breast and nipple assessment: normal assessment    [place] Assessment: normal assessment    Feeding assessment: feeding well  LATCH:  Latch: Grasps breast, tongue down, lips flanged, rhythmic sucking   Audible Swallowing: Spontaneous and intermittent (24 hours old)   Type of Nipple: Everted (After stimulation)   Comfort (Breast/Nipple): Soft/non-tender   Hold (Positioning): No assist from staff, mother able to position/hold infant   LATCH Score: 10          Feeding recommendations:  breast feed on demand    Israel Vee RN 5/23/2018 4:17 PM

## 2018-05-23 NOTE — L&D DELIVERY NOTE
[facility] Procedure Note:    Pre-Operative Diagnosis: IUP at 37w0d, prior classical , 13cm anterior lower uterine segment fibroid, breech    Post-Operative Diagnosis: Same, Delivered, live male, true knot in cord    Attending:Dr Edi Rodríguez  Resident:none available  First assist: Dr Félix Larkin  Anesthesia: spinal Dr Small Better    Specimens: Arterial & Venous Cord Gases, Cord Blood, Segment of Umbilical Cord (for storage), Placenta to storage    EBL: 2922 mL    Complications: unsuccessful entry into uterine cavity at lower uterine segment due to fibroid; clasical incision required    Findings:  Viable male weighing 6lbs 10oz; Apgar scores of 8 at one minute and 9 at five minutes  clear amniotic fluid, true knot in umbilical cord x 1; loose  Uterus with 13cm lower segment fibroid occupying entire lower segment but difficult to palpate externally, normal tubes, and ovaries  Normal placenta with 3-vessel cord    Operative Procedure: The patient was taken to the operating room where a time out was performed to confirm correct patient and correct procedure  Spinal anesthesia was adequately established  Prior to incision    1gm Ancef was given for preoperative prophylaxis  The patient was then placed in a supine position with a left lateral tilt  Chen catheter was placed in sterile fashion  Fetal heart tones were noted to be normal  The patient was then prepped and draped in the usual sterile fashion  Anesthesia was tested using an allis clamp and noted to be adequate  A Pfannenstiel incision was therefore attempted, overlying the previous incision and carried down through the underlying subcutaneous tissue to the fascia using a scalpel  Rectus fascia was then incised  All anatomic layers were well-demarcated  The rectus muscles were  and the peritoneum was identified, entered, and extended longitudinally with blunt dissection and the bovie cautery   The uterus was dextro rotated  The fetal vertex was palpable in the right upper quadrant of the uterus  The fibroid was felt to occupy most of the lower uterine segment, but slightly more on the right side  The bladder blade was inserted  A low transverse uterine incision was made with the superficial strokes of the scalpel in the midline and left  However entry into the uterine cavity was unsuccessful due to presence of fibroid  Decision was made to make vertical, classical incision to the left of midline  Using scalpel, entry into the cavity was successful  Using bandage scissors, the incision was extended superiorly and inferiorly  The amnion was entered spontaneously  The fetal back and buttocks were palpated  The hips were able to be rotated down to the incision, elevated, and delivered through the uterine incision followed by the body without difficulty  Baby had spontaneous cry with good color and tone  Baby was dried and bulb suctioned  The umbilical cord was clamped and cut after 60 second delay  The infant was handed off to the  providers  Arterial and venous cord gases, cord blood, and a segment of umbilical cord were obtained for evaluation  The placenta was delivered manually and appeared normal and intace  The uterus was exteriorized with some difficulty and curretted with a moist lap sponge  The classical uterine incision was closed with multiple running locked suture of 0 Vicryl  A final layer of the same suture was used to imbricate the deeper layers  A serosal stitch was completed with 0-vicryl  Hemostasis was noted to be excellent  Attention was then turned to the low transverse incision overlying the fibroid  Using 0-vicryl this serosal and partial uterine wall incision was closed in a running locked fashion  Hemostasis was good  Normal saline solution was used to irrigate the anterior and posterior culdesac    The uterus was returned to the abdomen but due to the enlarged size and difficulty replacing it, a serosal rent occurred in the mid right uterus, overlying the fibroid  This took many stitches of both 0-vicryl and 3-0 vicryl to stop the bleeding  The uterus was then replaced into the abdominal cavity and repair sites were reexamined and noted to be hemostatic  Surgifoam was appiled to the uterine surface overlying the classical incision, the rent, and the lower uterine segment  The fascia was closed with a running suture of 0 Vicryl  The subcutaneous  tissue was less than 2cm and was not reapproximated  The subcutaneous space was irrigated with saline  Hemostasis was obtained with the cautery  The skin incision was closed with staplesl  Good hemostasis was noted  ABD pads and Telfa pads were placed sterilely placed on top of the skin incision as a surgical dressing  All needle and instrument counts were noted to be correct x 2 at the end of the procedure however 5 extra sponges were listed as added to the count and not present upon completion of the procedure  Xray was ordered and negative  The patient was then cleansed and transferred to the recovery room  She received transxemic acid intraoperatively and methergine, in addition to pitocin  Due to the complexity of the case, the surgery lasted twice as long as is typical, 2 hours in duration  Overall, the patient tolerated the procedure well and is currently in stable condition in the PACU with her   Dr Rosario Dias was present for the entire procedure

## 2018-05-23 NOTE — PROGRESS NOTES
Progress Note - OB/GYN   Mike Yap 29 y o  female MRN: [id number]  Unit/Bed#: L&D 312-01 Encounter: [id number]    Assessment:  PP/POD#0 s/p repeat Classical  Section, stable    Plan:  1  Routine post op care  -davalos to remain until the am  -EBL 1500cc--> lochia minimal-->11 7-->8 6  -continue to monitor patient closely-she is type and crossed for 2 units if needed  2  Post op nausea and gas pain  -will give IV Zofran and PO simethicone  3  Rh negative  -RHIG and rhogam postpartum      Subjective/Objective   Chief Complaint:     PP/POD#0 s/p Classical  Section    Subjective:     Pain: yes, she states it is related to her fibroids  Tolerating PO: yes  Voiding: no  Flatus: no  BM: no  Ambulating: no  Breastfeeding: Breastfeeding  Chest pain: no  Shortness of breath: no  Leg pain: no  Lochia: minimal    Objective:     Vitals:  Vitals:    18 1215 18 1324 18 1415 18 1515   BP: (!) 89/59 (!) 88/55 95/59 (!) 83/59   BP Location: Left arm Left arm Left arm Left arm   Pulse: 90 96 96 97   Resp: 16 16 16 16   Temp: 97 8 °F (36 6 °C) 98 5 °F (36 9 °C) 98 °F (36 7 °C) 98 4 °F (36 9 °C)   TempSrc: Oral Oral Oral Oral   SpO2: 100% 99% 99% 99%   Weight:       Height:           Physical Exam:     Physical Exam   GEN: patient appears ill, feeling nauseated after receiving percocet  Lungs: CTAB  CV:RRR  Abdomen: soft, mildly tender, non distended, dressing in place over the incision, it is dry  Ext: non tender  Uterine fundus firm and non-tender, +1 cm above the umbilicus       Lab, Imaging and other studies: I have personally reviewed pertinent reports        Lab Results   Component Value Date    WBC 11 72 (H) 2018    HGB 8 6 (L) 2018    HCT 25 7 (L) 2018    MCV 82 2018     2018               Ashley Vazquez MD  18

## 2018-05-23 NOTE — PLAN OF CARE
Problem: PAIN - ADULT  Goal: Verbalizes/displays adequate comfort level or baseline comfort level  Interventions:  - Encourage patient to monitor pain and request assistance  - Assess pain using appropriate pain scale  - Administer analgesics based on type and severity of pain and evaluate response  - Implement non-pharmacological measures as appropriate and evaluate response  - Consider cultural and social influences on pain and pain management  - Notify physician/advanced practitioner if interventions unsuccessful or patient reports new pain  Outcome: Progressing      Problem: INFECTION - ADULT  Goal: Absence or prevention of progression during hospitalization  INTERVENTIONS:  - Assess and monitor for signs and symptoms of infection  - Monitor lab/diagnostic results  - Monitor all insertion sites, i e  indwelling lines, tubes, and drains  - Monitor endotracheal (as able) and nasal secretions for changes in amount and color  - [place] appropriate cooling/warming therapies per order  - Administer medications as ordered  - Instruct and encourage patient and family to use good hand hygiene technique  - Identify and instruct in appropriate isolation precautions for identified infection/condition  Outcome: Progressing    Goal: Absence of fever/infection during neutropenic period  INTERVENTIONS:  - Monitor WBC  - Implement neutropenic guidelines  Outcome: Progressing      Problem: SAFETY ADULT  Goal: Patient will remain free of falls  INTERVENTIONS:  - Assess patient frequently for physical needs  -  Identify cognitive and physical deficits and behaviors that affect risk of falls    -  [place] fall precautions as indicated by assessment   - Educate patient/family on patient safety including physical limitations  - Instruct patient to call for assistance with activity based on assessment  - Modify environment to reduce risk of injury  - Consider OT/PT consult to assist with strengthening/mobility  Outcome: Progressing    Goal: Maintain or return to baseline ADL function  INTERVENTIONS:  -  Assess patient's ability to carry out ADLs; assess patient's baseline for ADL function and identify physical deficits which impact ability to perform ADLs (bathing, care of mouth/teeth, toileting, grooming, dressing, etc )  - Assess/evaluate cause of self-care deficits   - Assess range of motion  - Assess patient's mobility; develop plan if impaired  - Assess patient's need for assistive devices and provide as appropriate  - Encourage maximum independence but intervene and supervise when necessary  ¯ Involve family in performance of ADLs  ¯ Assess for home care needs following discharge   ¯ Request OT consult to assist with ADL evaluation and planning for discharge  ¯ Provide patient education as appropriate  Outcome: Progressing    Goal: Maintain or return mobility status to optimal level  INTERVENTIONS:  - Assess patient's baseline mobility status (ambulation, transfers, stairs, etc )    - Identify cognitive and physical deficits and behaviors that affect mobility  - Identify mobility aids required to assist with transfers and/or ambulation (gait belt, sit-to-stand, lift, walker, cane, etc )  - [place] fall precautions as indicated by assessment  - Record patient progress and toleration of activity level on Mobility SBAR; progress patient to next Phase/Stage  - Instruct patient to call for assistance with activity based on assessment  - Request Rehabilitation consult to assist with strengthening/weightbearing, etc   Outcome: Progressing      Problem: Knowledge Deficit  Goal: Patient/family/caregiver demonstrates understanding of disease process, treatment plan, medications, and discharge instructions  Complete learning assessment and assess knowledge base    Interventions:  - Provide teaching at level of understanding  - Provide teaching via preferred learning methods  Outcome: Progressing      Problem: DISCHARGE PLANNING  Goal: Discharge to home or other facility with appropriate resources  INTERVENTIONS:  - Identify barriers to discharge w/patient and caregiver  - Arrange for needed discharge resources and transportation as appropriate  - Identify discharge learning needs (meds, wound care, etc )  - Arrange for interpretive services to assist at discharge as needed  - Refer to Case Management Department for coordinating discharge planning if the patient needs post-hospital services based on physician/advanced practitioner order or complex needs related to functional status, cognitive ability, or social support system  Outcome: Progressing

## 2018-05-23 NOTE — ANESTHESIA PROCEDURE NOTES
Spinal Block    Patient location during procedure: OB  Start time: 5/23/2018 7:51 AM  End time: 5/23/2018 7:59 AM  Reason for block: primary anesthetic  Staffing  Resident/CRNA: Lou Sy  Performed: resident/CRNA   Preanesthetic Checklist  Completed: patient identified, site marked, surgical consent, pre-op evaluation, timeout performed, IV checked, risks and benefits discussed and monitors and equipment checked  Spinal Block  Patient position: sitting  Prep: Betadine  Patient monitoring: heart rate, cardiac monitor, continuous pulse ox and frequent blood pressure checks  Approach: midline  Location: L3-4  Injection technique: single-shot  Needle  Needle type: pencil-tip   Needle gauge: 24 G  Needle length: 10 cm  Assessment  Sensory level: T6   Injection Assessment:  negative aspiration for heme, no paresthesia on injection and positive aspiration for clear CSF    Post-procedure:  site cleaned  Additional Notes  Epi wash

## 2018-05-23 NOTE — PLAN OF CARE
Problem: Labor & Delivery  Goal: Manages discomfort  Assess and monitor for signs and symptoms of discomfort  Assess patient's pain level regularly and per hospital policy  Administer medications as ordered  Support use of nonpharmacological methods to help control pain such as distraction, imagery, relaxation, and application of heat and cold  Collaborate with interdisciplinary team and patient to determine appropriate pain management plan  1  Include patient in decisions related to comfort  2  Offer non-pharmacological pain management interventions  3  Report ineffective pain management to physician  Outcome: Progressing    Goal: Patient vital signs are stable  1  Assess vital signs - vaginal delivery    Outcome: Progressing

## 2018-05-24 LAB
BASOPHILS # BLD AUTO: 0.01 THOUSANDS/ΜL (ref 0–0.1)
BASOPHILS NFR BLD AUTO: 0 % (ref 0–1)
EOSINOPHIL # BLD AUTO: 0.02 THOUSAND/ΜL (ref 0–0.61)
EOSINOPHIL NFR BLD AUTO: 0 % (ref 0–6)
ERYTHROCYTE [DISTWIDTH] IN BLOOD BY AUTOMATED COUNT: 14.6 % (ref 11.6–15.1)
HCT VFR BLD AUTO: 20.9 % (ref 34.8–46.1)
HGB BLD-MCNC: 6.9 G/DL (ref 11.5–15.4)
LYMPHOCYTES # BLD AUTO: 1.31 THOUSANDS/ΜL (ref 0.6–4.47)
LYMPHOCYTES NFR BLD AUTO: 14 % (ref 14–44)
MCH RBC QN AUTO: 27.1 PG (ref 26.8–34.3)
MCHC RBC AUTO-ENTMCNC: 33 G/DL (ref 31.4–37.4)
MCV RBC AUTO: 82 FL (ref 82–98)
MONOCYTES # BLD AUTO: 0.48 THOUSAND/ΜL (ref 0.17–1.22)
MONOCYTES NFR BLD AUTO: 5 % (ref 4–12)
NEUTROPHILS # BLD AUTO: 7.72 THOUSANDS/ΜL (ref 1.85–7.62)
NEUTS SEG NFR BLD AUTO: 81 % (ref 43–75)
NRBC BLD AUTO-RTO: 0 /100 WBCS
PLATELET # BLD AUTO: 124 THOUSANDS/UL (ref 149–390)
PMV BLD AUTO: 9.3 FL (ref 8.9–12.7)
RBC # BLD AUTO: 2.55 MILLION/UL (ref 3.81–5.12)
RPR SER QL: NORMAL
WBC # BLD AUTO: 9.54 THOUSAND/UL (ref 4.31–10.16)

## 2018-05-24 PROCEDURE — P9040 RBC LEUKOREDUCED IRRADIATED: HCPCS | Performed by: ANESTHESIOLOGY

## 2018-05-24 PROCEDURE — 93005 ELECTROCARDIOGRAM TRACING: CPT

## 2018-05-24 PROCEDURE — 86923 COMPATIBILITY TEST ELECTRIC: CPT | Performed by: ANESTHESIOLOGY

## 2018-05-24 PROCEDURE — 85025 COMPLETE CBC W/AUTO DIFF WBC: CPT | Performed by: OBSTETRICS & GYNECOLOGY

## 2018-05-24 PROCEDURE — P9021 RED BLOOD CELLS UNIT: HCPCS | Performed by: ANESTHESIOLOGY

## 2018-05-24 PROCEDURE — 30233N1 TRANSFUSION OF NONAUTOLOGOUS RED BLOOD CELLS INTO PERIPHERAL VEIN, PERCUTANEOUS APPROACH: ICD-10-PCS | Performed by: OBSTETRICS & GYNECOLOGY

## 2018-05-24 RX ADMIN — OXYCODONE HYDROCHLORIDE AND ACETAMINOPHEN 1 TABLET: 5; 325 TABLET ORAL at 09:36

## 2018-05-24 RX ADMIN — OXYCODONE HYDROCHLORIDE AND ACETAMINOPHEN 2 TABLET: 5; 325 TABLET ORAL at 13:46

## 2018-05-24 RX ADMIN — DOCUSATE SODIUM 100 MG: 100 CAPSULE, LIQUID FILLED ORAL at 09:34

## 2018-05-24 RX ADMIN — SIMETHICONE CHEW TAB 80 MG 80 MG: 80 TABLET ORAL at 18:22

## 2018-05-24 RX ADMIN — IBUPROFEN 600 MG: 600 TABLET ORAL at 12:03

## 2018-05-24 RX ADMIN — OXYCODONE HYDROCHLORIDE AND ACETAMINOPHEN 2 TABLET: 5; 325 TABLET ORAL at 18:23

## 2018-05-24 RX ADMIN — HUMAN RHO(D) IMMUNE GLOBULIN 300 MCG: 300 INJECTION, SOLUTION INTRAMUSCULAR at 09:38

## 2018-05-24 RX ADMIN — IBUPROFEN 600 MG: 600 TABLET ORAL at 05:59

## 2018-05-24 RX ADMIN — DOCUSATE SODIUM 100 MG: 100 CAPSULE, LIQUID FILLED ORAL at 18:22

## 2018-05-24 RX ADMIN — OXYCODONE HYDROCHLORIDE AND ACETAMINOPHEN 2 TABLET: 5; 325 TABLET ORAL at 22:14

## 2018-05-24 RX ADMIN — SODIUM CHLORIDE, SODIUM LACTATE, POTASSIUM CHLORIDE, AND CALCIUM CHLORIDE 125 ML/HR: .6; .31; .03; .02 INJECTION, SOLUTION INTRAVENOUS at 07:08

## 2018-05-24 NOTE — LACTATION NOTE
This note was copied from a baby's chart  Mother verbalized breastfeeding is going well  Enc to call for assistance as needed,phone # given

## 2018-05-24 NOTE — DISCHARGE SUMMARY
CS Discharge Summary - Raina Escamilla 29 y o  female MRN: [id number]    Unit/Bed#: L&D 312-01 Encounter: [id number]    Admission Date: 2018     Discharge Date: 2018    Admitting Diagnosis:   1  IUP at 37w0d  2  Prior classical   3  13cm anterior lower uterine segment fibroid  4  Breech  5  Acute blood loss anemia  6  True knot in umbilical cord    Discharge Diagnosis:   Same, delivered live male infant  Classical incision required due to unsuccessful entry into uterine cavity at lower uterine segment due to fibroid  Intra-op hemorrhage EBL 1500cc    Procedures:   repeat  section, classical incision    Attending: Dr Ajit Fraire service: none  Consult attending: none    Hospital Course:     Raina Escamilla is a 29 y o  [id number] who was admitted at 37w0d for scheduled repeat  section        The operation was complicated by intraop hemorrhage EBL 1500cc  The initial incision was made low transverse and transected the patient's 13cm fibroid  A repeat classical  section was then undertaken  The patient received Tranexamic acid x1, Methergine x1, and pitocin intraop  The patient remained stable and the  was transferred to  nursery  Patient tolerated the procedure well and was transferred to recovery in stable condition  Her post-operative course was complicated by anemia  Outpatient hemoglobin was 10 3  Preoperative hemaglobin was 11 7, postoperative was 8 6 the evening of the section, this was trended to 6 9 the next morning  She was transfused 2 units prbc's (350cc and 250cc respectively) and improved clinically  Repeat hemoglobin 7 7  Her postoperative pain was well controlled with oral analgesics  On day of discharge, she was ambulating and able to reasonably perform all ADLs  She was voiding and had appropriate bowel function  Pain was well controlled  She was discharged home on post-operative day #4 without complications   Patient was instructed to follow up with her OB as an outpatient for staple removal and was given appropriate warnings to call provider if she develops signs of infection or uncontrolled pain  Complications:   None    Condition at discharge:   good     Provisions for Follow-Up Care:  See after visit summary for information related to follow-up care and any pertinent home health orders  Disposition:   Home    Planned Readmission:   No    Discharge Medications:   Prenatal vitamin daily for 6 months or the duration of nursing whichever is longer  Motrin 600 mg orally every 6 hours as needed for pain  Tylenol (over the counter) per bottle directions as needed for pain  Percocet prescription given  Stool softener recommended  Iron and vitamin c to be resumed  Discharge instructions :   -Do not place anything (no partner, tampons or douche) in your vagina for 6 weeks  -You may walk for exercise for the first 6 weeks then gradually return to your usual activities    -Please do not drive for 2 weeks  -You may take baths or shower per your preference    -Please look at your bust (breasts) in the mirror daily and call provider for redness or tenderness or increased warmth  - If you have had a  please look at your incision daily as well and call provider for increasing redness or steady drainage from the incision    -Please call your provider if temperature > 100 4*F or 38* C, worsening pain or a foul discharge

## 2018-05-24 NOTE — PLAN OF CARE
Problem: PAIN - ADULT  Goal: Verbalizes/displays adequate comfort level or baseline comfort level  Interventions:  - Encourage patient to monitor pain and request assistance  - Assess pain using appropriate pain scale  - Administer analgesics based on type and severity of pain and evaluate response  - Implement non-pharmacological measures as appropriate and evaluate response  - Consider cultural and social influences on pain and pain management  - Notify physician/advanced practitioner if interventions unsuccessful or patient reports new pain   Outcome: Progressing      Problem: INFECTION - ADULT  Goal: Absence or prevention of progression during hospitalization  INTERVENTIONS:  - Assess and monitor for signs and symptoms of infection  - Monitor lab/diagnostic results  - Monitor all insertion sites, i e  indwelling lines, tubes, and drains  - Monitor endotracheal (as able) and nasal secretions for changes in amount and color  - [place] appropriate cooling/warming therapies per order  - Administer medications as ordered  - Instruct and encourage patient and family to use good hand hygiene technique  - Identify and instruct in appropriate isolation precautions for identified infection/condition   Outcome: Progressing    Goal: Absence of fever/infection during neutropenic period  INTERVENTIONS:  - Monitor WBC  - Implement neutropenic guidelines   Outcome: Progressing      Problem: SAFETY ADULT  Goal: Patient will remain free of falls  INTERVENTIONS:  - Assess patient frequently for physical needs  -  Identify cognitive and physical deficits and behaviors that affect risk of falls    -  [place] fall precautions as indicated by assessment   - Educate patient/family on patient safety including physical limitations  - Instruct patient to call for assistance with activity based on assessment  - Modify environment to reduce risk of injury  - Consider OT/PT consult to assist with strengthening/mobility   Outcome: Progressing    Goal: Maintain or return to baseline ADL function  INTERVENTIONS:  -  Assess patient's ability to carry out ADLs; assess patient's baseline for ADL function and identify physical deficits which impact ability to perform ADLs (bathing, care of mouth/teeth, toileting, grooming, dressing, etc )  - Assess/evaluate cause of self-care deficits   - Assess range of motion  - Assess patient's mobility; develop plan if impaired  - Assess patient's need for assistive devices and provide as appropriate  - Encourage maximum independence but intervene and supervise when necessary  ¯ Involve family in performance of ADLs  ¯ Assess for home care needs following discharge   ¯ Request OT consult to assist with ADL evaluation and planning for discharge  ¯ Provide patient education as appropriate   Outcome: Progressing    Goal: Maintain or return mobility status to optimal level  INTERVENTIONS:  - Assess patient's baseline mobility status (ambulation, transfers, stairs, etc )    - Identify cognitive and physical deficits and behaviors that affect mobility  - Identify mobility aids required to assist with transfers and/or ambulation (gait belt, sit-to-stand, lift, walker, cane, etc )  - [place] fall precautions as indicated by assessment  - Record patient progress and toleration of activity level on Mobility SBAR; progress patient to next Phase/Stage  - Instruct patient to call for assistance with activity based on assessment  - Request Rehabilitation consult to assist with strengthening/weightbearing, etc    Outcome: Progressing      Problem: Knowledge Deficit  Goal: Patient/family/caregiver demonstrates understanding of disease process, treatment plan, medications, and discharge instructions  Complete learning assessment and assess knowledge base    Interventions:  - Provide teaching at level of understanding  - Provide teaching via preferred learning methods   Outcome: Progressing      Problem: DISCHARGE PLANNING  Goal: Discharge to home or other facility with appropriate resources  INTERVENTIONS:  - Identify barriers to discharge w/patient and caregiver  - Arrange for needed discharge resources and transportation as appropriate  - Identify discharge learning needs (meds, wound care, etc )  - Arrange for interpretive services to assist at discharge as needed  - Refer to Case Management Department for coordinating discharge planning if the patient needs post-hospital services based on physician/advanced practitioner order or complex needs related to functional status, cognitive ability, or social support system   Outcome: Progressing

## 2018-05-24 NOTE — PROGRESS NOTES
Documentation (presumably by PCA?) states that at 5/24/18 at [phone] that nurse Corazon Conde was notified, "md notified"  I can attest that I was not notified of patient's hypotension  However, I was notified of the patient's drop in Hb to 6 9  Pt is T&C for 2u, Dr Bebo Stack was paged  Pt seen on postpartum rounds by Dr Jad Sanchez, patient is asymptomatic on physical exam  Plan to likely transfuse RBCs soon      Aparna Sky, DO  PGY-1 OB/GYN   5/24/2018 6:44 AM

## 2018-05-24 NOTE — DISCHARGE INSTRUCTIONS
WHAT YOU NEED TO KNOW:   A , or  section, is abdominal surgery to deliver your baby  DISCHARGE INSTRUCTIONS:   Call 911 for any of the following:   · You feel lightheaded, short of breath, and have chest pain  · You cough up blood  Seek care immediately if:   · Blood soaks through your bandage  · Your stitches come apart  · Your arm or leg feels warm, tender, and painful  It may look swollen and red  Contact your OB if:   · You have heavy vaginal bleeding that fills 1 or more sanitary pads in 1 hour  · You have a fever  · Your incision is swollen, red, or draining pus  · You have questions or concerns about yourself or your baby  Medicines: You may  need any of the following:  · Prescription pain medicine  may be given  Ask how to take this medicine safely  · Acetaminophen  decreases pain and fever  It is available without a doctor's order  Ask how much to take and how often to take it  Follow directions  Acetaminophen can cause liver damage if not taken correctly  · NSAIDs , such as ibuprofen, help decrease swelling, pain, and fever  NSAIDs can cause stomach bleeding or kidney problems in certain people  If you take blood thinner medicine, always ask your healthcare provider if NSAIDs are safe for you  Always read the medicine label and follow directions  · Take your medicine as directed  Contact your healthcare provider if you think your medicine is not helping or if you have side effects  Tell him or her if you are allergic to any medicine  Keep a list of the medicines, vitamins, and herbs you take  Include the amounts, and when and why you take them  Bring the list or the pill bottles to follow-up visits  Carry your medicine list with you in case of an emergency  Wound care:  Carefully wash your wound with soap and water every day  Keep your wound clean and dry  Wear loose, comfortable clothes that do not rub against your wound   Ask your OB about bathing and showering  Limit activity as directed:   · Ask when it is safe for you to drive, walk up stairs, lift heavy objects, and have sex  · Ask when it is okay to exercise, and what types of exercise to do  Start slowly and do more as you get stronger  Drink liquids as directed:  Liquids help keep you hydrated after your procedure and decrease your risk for a blood clot  Ask how much liquid to drink each day and which liquids are best for you  Follow up with your OB as directed: You may need to return to have your stitches or staples removed  Write down your questions so you remember to ask them during your visits  © 2017 [employer] Information is for End User's use only and may not be sold, redistributed or otherwise used for commercial purposes  All illustrations and images included in CareNotes® are the copyrighted property of [employer]  or Nawaf Jim[employer]  The above information is an  only  It is not intended as medical advice for individual conditions or treatments  Talk to your doctor, nurse or pharmacist before following any medical regimen to see if it is safe and effective for you

## 2018-05-24 NOTE — PROGRESS NOTES
Progress Note - OB/GYN   Frutoso Rom 29 y o  female MRN: [id number]  Unit/Bed#: L&D 36-1 Encounter: [id number]    Assessment:  29 y o  [id number] s/p Repeat classical  section post-operative day 1  Acute blood loss anemia  Postpartum hemorrhage    Rh negative  Fibroid uterus    Plan:  1  Routine post-partum care   -davalos removed this morning, f/u voiding trial  2  Acute blood loss anemia 2/2 PPH EBL 1500cc intraop   -s/p TXA, Methergine x1 and extra pit intraop   -Hb 11 7-->8 6-->6 9    -currently asymptomatic, but patient hasn't ambulated this morning   -monitor closely for symptoms of anemia, T&C for 2 units  3  Rh Negative   -RhoGAM ordered      Subjective/Objective   Chief Complaint:       Subjective:  Frutoso Rom is well appearing and has no complaints at this time  Pain: Well controlled with pain medication regimen  Tolerating PO: yes  Voiding: yes  Flatus: yes  BM: no  Ambulating: yes  Breastfeeding:  gas  Chest pain: no  Shortness of breath: no  Leg pain: no  Lochia:  Appropriate    Objective:     Vitals: Blood pressure (!) 84/48, pulse 97, temperature 98 6 °F (37 °C), temperature source Oral, resp  rate 17, height 5' 4" (1 626 m), weight 77 1 kg (170 lb), SpO2 99 %, currently breastfeeding      Intake/Output Summary (Last 24 hours) at 18 7437  Last data filed at 18 0601   Gross per 24 hour   Intake             4200 ml   Output             5075 ml   Net             -875 ml       Physical Exam:     General: NAD  Cardiovascular: RRR, no murmur, nl S1/S2   Lungs: CTAB, non-labored breathing   Abdomen: Soft, no distension/rebound/guarding/tenderness   Fundus: Firm, non-tender, fundus: at the umbilicus   Incision: staples, c/d/intact, dressing removed  Lower Extremities: Non-tender  Other:  None    Lab, Imaging and other studies:     Recent Results (from the past 72 hour(s))   Type and screen and continue to monitor patient    Collection Time: 18  6:12 AM   Result Value Ref Range    ABO Grouping A     Rh Factor Negative     Antibody Screen Positive     Specimen Expiration Date [id number]    CBC    Collection Time: 05/23/18  6:12 AM   Result Value Ref Range    WBC 7 78 4 31 - 10 16 Thousand/uL    RBC 4 32 3 81 - 5 12 Million/uL    Hemoglobin 11 7 11 5 - 15 4 g/dL    Hematocrit 35 3 34 8 - 46 1 %    MCV 82 82 - 98 fL    MCH 27 1 26 8 - 34 3 pg    MCHC 33 1 31 4 - 37 4 g/dL    RDW 14 4 11 6 - 15 1 %    Platelets 589 621 - 283 Thousands/uL    MPV 9 9 8 9 - 12 7 fL   Antibody identification    Collection Time: 05/23/18  6:12 AM   Result Value Ref Range    ANTIBODY ID  #1 Passive D Antibody, Patient Received RHIG    Prepare RBC:Special Requirements: Other (Specify), None; Has consent been obtained? Yes; Date of Surgery: 5/23/2018; Where is the Surgery Scheduled?  [employer] [facility], 2 Units    Collection Time: 05/23/18  8:12 AM   Result Value Ref Range    Unit Product Code Y9201Y76     Unit Number [id number]     Unit ABO A     Unit [facility] NEG     Crossmatch Compatible     Unit Dispense Status Crossmatched     Unit Product Code Y3722R78     Unit Number [id number]     Unit ABO A     Unit RH NEG     Crossmatch Compatible     Unit Dispense Status Crossmatched    Blood gas, venous, cord    Collection Time: 05/23/18  8:39 AM   Result Value Ref Range    pH, Cord Isra 7 362 7 190 - 7 490    pCO2, Cord Isra 43 9 (H) 27 0 - 43 0 mm HG    pO2, Cord Isra 39 3 15 0 - 45 0 mm HG    HCO3, Cord Isra 24 4 12 2 - 28 6 mmol/L    Base Exc, Cord Isra -1 2 (L) 1 0 - 9 0 mmol/L    O2 Cont, Cord Isra 16 8 mL/dL    O2 HGB,VENOUS CORD 83 3 %   POCT Blood Gas (CG8+)    Collection Time: 05/23/18  9:33 AM   Result Value Ref Range    ph, Isra ISTAT 7 375 7 300 - 7 400    pCO2, Isra i-STAT 41 9 (L) 42 0 - 50 0 mm HG    pO2, Isra i-STAT 19 0 (L) 35 0 - 45 0 mm HG    BE, i-STAT -1 -2 - 3 mmol/L    HCO3, Isra i-STAT 24 5 24 0 - 30 0 mmol/L    CO2, i-STAT 26 21 - 32 mmol/L    O2 Sat, i-STAT 28 (L) 95 - 98 %    SODIUM, I-STAT 137 136 - 145 mmol/l    Potassium, i-STAT 3 3 (L) 3 5 - 5 3 mmol/L    Calcium, Ionized i-STAT 1 17 1 12 - 1 32 mmol/L    Hct, i-STAT 29 (L) 34 8 - 46 1 %    Hgb, i-STAT 9 9 (L) 11 5 - 15 4 g/dl    Glucose, i-STAT 99 65 - 140 mg/dl    Specimen Type VENOUS    CBC    Collection Time: 05/23/18  5:29 PM   Result Value Ref Range    WBC 11 72 (H) 4 31 - 10 16 Thousand/uL    RBC 3 13 (L) 3 81 - 5 12 Million/uL    Hemoglobin 8 6 (L) 11 5 - 15 4 g/dL    Hematocrit 25 7 (L) 34 8 - 46 1 %    MCV 82 82 - 98 fL    MCH 27 5 26 8 - 34 3 pg    MCHC 33 5 31 4 - 37 4 g/dL    RDW 14 6 11 6 - 15 1 %    Platelets 410 083 - 950 Thousands/uL    MPV 9 3 8 9 - 12 7 fL   Postpartum Rhogam    Collection Time: 05/23/18  5:29 PM   Result Value Ref Range    ABO Grouping A     Rh Factor Negative     Antibody Screen Negative     Fetal Bleed Screen Negative    CBC and differential    Collection Time: 05/24/18  6:03 AM   Result Value Ref Range    WBC 9 54 4 31 - 10 16 Thousand/uL    RBC 2 55 (L) 3 81 - 5 12 Million/uL    Hemoglobin 6 9 (LL) 11 5 - 15 4 g/dL    Hematocrit 20 9 (L) 34 8 - 46 1 %    MCV 82 82 - 98 fL    MCH 27 1 26 8 - 34 3 pg    MCHC 33 0 31 4 - 37 4 g/dL    RDW 14 6 11 6 - 15 1 %    MPV 9 3 8 9 - 12 7 fL    Platelets 004 (L) 917 - 390 Thousands/uL    nRBC 0 /100 WBCs    Neutrophils Relative 81 (H) 43 - 75 %    Lymphocytes Relative 14 14 - 44 %    Monocytes Relative 5 4 - 12 %    Eosinophils Relative 0 0 - 6 %    Basophils Relative 0 0 - 1 %    Neutrophils Absolute 7 72 (H) 1 85 - 7 62 Thousands/µL    Lymphocytes Absolute 1 31 0 60 - 4 47 Thousands/µL    Monocytes Absolute 0 48 0 17 - 1 22 Thousand/µL    Eosinophils Absolute 0 02 0 00 - 0 61 Thousand/µL    Basophils Absolute 0 01 0 00 - 0 10 Thousands/µL     Meds:    docusate sodium 100 mg Oral BID   oxytocin 62 5 eri-units/min Intravenous Once   Rho(D) immune globulin 300 mcg Intramuscular Once       acetaminophen 650 mg Q6H PRN   bisacodyl 10 mg Daily PRN   diphenhydrAMINE 25 mg Q6H PRN ibuprofen 600 mg Q6H PRN   metoclopramide 10 mg Q6H PRN   ondansetron 4 mg Q6H PRN   oxyCODONE-acetaminophen 1 tablet Q4H PRN   oxyCODONE-acetaminophen 2 tablet Q4H PRN   oxyCODONE-acetaminophen 2 tablet Q4H PRN   promethazine 25 mg Q6H PRN   simethicone 80 mg 4x Daily PRN   simethicone 80 mg Q6H PRN   tetanus-diphtheria-acellular pertussis 0 5 mL Prior to discharge             Signature / Title: Reagan Sexton MD, Ob/Gyn, PGY-2  Date: 5/24/2018  Time: 6:58 AM

## 2018-05-25 LAB
ABO GROUP BLD BPU: NORMAL
ATRIAL RATE: 102 BPM
ATRIAL RATE: 105 BPM
BPU ID: NORMAL
CROSSMATCH: NORMAL
CROSSMATCH: NORMAL
ERYTHROCYTE [DISTWIDTH] IN BLOOD BY AUTOMATED COUNT: 15 % (ref 11.6–15.1)
HCT VFR BLD AUTO: 23.5 % (ref 34.8–46.1)
HGB BLD-MCNC: 7.7 G/DL (ref 11.5–15.4)
MCH RBC QN AUTO: 27.4 PG (ref 26.8–34.3)
MCHC RBC AUTO-ENTMCNC: 32.8 G/DL (ref 31.4–37.4)
MCV RBC AUTO: 84 FL (ref 82–98)
P AXIS: 52 DEGREES
P AXIS: 57 DEGREES
PLATELET # BLD AUTO: 135 THOUSANDS/UL (ref 149–390)
PMV BLD AUTO: 9.4 FL (ref 8.9–12.7)
PR INTERVAL: 134 MS
PR INTERVAL: 134 MS
QRS AXIS: 33 DEGREES
QRS AXIS: 34 DEGREES
QRSD INTERVAL: 80 MS
QRSD INTERVAL: 80 MS
QT INTERVAL: 322 MS
QT INTERVAL: 326 MS
QTC INTERVAL: 419 MS
QTC INTERVAL: 430 MS
RBC # BLD AUTO: 2.81 MILLION/UL (ref 3.81–5.12)
T WAVE AXIS: 15 DEGREES
T WAVE AXIS: 17 DEGREES
UNIT DISPENSE STATUS: NORMAL
UNIT PRODUCT CODE: NORMAL
UNIT RH: NORMAL
VENTRICULAR RATE: 102 BPM
VENTRICULAR RATE: 105 BPM
WBC # BLD AUTO: 11.3 THOUSAND/UL (ref 4.31–10.16)

## 2018-05-25 PROCEDURE — 93010 ELECTROCARDIOGRAM REPORT: CPT | Performed by: INTERNAL MEDICINE

## 2018-05-25 PROCEDURE — 85027 COMPLETE CBC AUTOMATED: CPT | Performed by: OBSTETRICS & GYNECOLOGY

## 2018-05-25 RX ADMIN — OXYCODONE HYDROCHLORIDE AND ACETAMINOPHEN 2 TABLET: 5; 325 TABLET ORAL at 09:12

## 2018-05-25 RX ADMIN — OXYCODONE HYDROCHLORIDE AND ACETAMINOPHEN 2 TABLET: 5; 325 TABLET ORAL at 22:43

## 2018-05-25 RX ADMIN — DOCUSATE SODIUM 100 MG: 100 CAPSULE, LIQUID FILLED ORAL at 09:12

## 2018-05-25 RX ADMIN — IBUPROFEN 600 MG: 600 TABLET ORAL at 08:00

## 2018-05-25 RX ADMIN — OXYCODONE HYDROCHLORIDE AND ACETAMINOPHEN 2 TABLET: 5; 325 TABLET ORAL at 13:12

## 2018-05-25 RX ADMIN — OXYCODONE HYDROCHLORIDE AND ACETAMINOPHEN 2 TABLET: 5; 325 TABLET ORAL at 18:00

## 2018-05-25 RX ADMIN — IBUPROFEN 600 MG: 600 TABLET ORAL at 01:46

## 2018-05-25 RX ADMIN — OXYCODONE HYDROCHLORIDE AND ACETAMINOPHEN 2 TABLET: 5; 325 TABLET ORAL at 04:41

## 2018-05-25 RX ADMIN — DOCUSATE SODIUM 100 MG: 100 CAPSULE, LIQUID FILLED ORAL at 18:00

## 2018-05-25 RX ADMIN — IBUPROFEN 600 MG: 600 TABLET ORAL at 21:10

## 2018-05-25 RX ADMIN — IBUPROFEN 600 MG: 600 TABLET ORAL at 15:00

## 2018-05-25 NOTE — PROGRESS NOTES
Progress note   Jeff Merino 29 y o  female MRN: [id number]  Unit/Bed#: L&D 312-01 Encounter: [id number]    Francesca Corral's  Pain is present at a level of 6/10  Patient is currently voiding  She is not ambulating  Patient is not currently passing flatus and has had no bowel movement  She is tolerating PO, and denies nausea or vomitting  Patient denies fever, chills, chest pain, shortness of breath, or calf tenderness  /69 (BP Location: Left arm)   Pulse (!) 106   Temp 98 9 °F (37 2 °C) (Oral)   Resp 16   Ht 5' 4" (1 626 m)   Wt 77 1 kg (170 lb)   SpO2 100%   Breastfeeding? Yes   BMI 29 18 kg/m²     I/O last 3 completed shifts: In: 6959 [P O :200; I V :4000; Blood:350]  Out: 3686 [Urine:5145; Blood:1300]  I/O this shift:  In: 250 [Blood:250]  Out: -     Lab Results   Component Value Date    WBC 11 30 (H) 05/25/2018    HGB 7 7 (L) 05/25/2018    HCT 23 5 (L) 05/25/2018    MCV 84 05/25/2018     (L) 05/25/2018       Lab Results   Component Value Date    GLUCOSE 99 05/23/2018    CALCIUM 7 9 (L) 04/24/2017     04/24/2017    K 3 4 (L) 04/24/2017    CO2 24 04/24/2017     04/24/2017    BUN 12 04/24/2017    CREATININE 0 59 (L) 04/24/2017       No results found for: MG    No results found for: POCGLU    Physical Exam  Gen: AAOx3, NAD, comfortable in bed   CVS:  RRR, no murmurs or gallops  Lungs: CTA B/L, no rales or rhonchi,  normal respiratory effort and rate  Abdomen: soft, mildly distended, appropriately tender, incision C/D/I, + BS  Extremities: SCDs on and on, non tender, no edema, negative Homans  A/P: Jeff Pee s/p repeat classical c section POD# 2    Pain: 6/10, patient states she is taking meds around the clock to keep pain controlled    Acute blood loss anemia 2/2 PPH EBL 1500cc intraop   - S/P transfusion of 2 PRBCs with today's AM Hb 7 7 from 6 9, pt states she feels a "little bit better", continue to                          monitor VSS and symptoms since increase in Hb less than 1 point after 2 units, it might be partially explained by hemodilution             - benign abdominal exam, appropriately tender, +BS  Rh Negative              -RhoGAM ordered  FEN: LR 125cc/hr, regular   DVT PPx: SCDs    Dispo: planning

## 2018-05-25 NOTE — PLAN OF CARE
Problem: PAIN - ADULT  Goal: Verbalizes/displays adequate comfort level or baseline comfort level  Interventions:  - Encourage patient to monitor pain and request assistance  - Assess pain using appropriate pain scale  - Administer analgesics based on type and severity of pain and evaluate response  - Implement non-pharmacological measures as appropriate and evaluate response  - Consider cultural and social influences on pain and pain management  - Notify physician/advanced practitioner if interventions unsuccessful or patient reports new pain   Outcome: Progressing      Problem: INFECTION - ADULT  Goal: Absence or prevention of progression during hospitalization  INTERVENTIONS:  - Assess and monitor for signs and symptoms of infection  - Monitor lab/diagnostic results  - Monitor all insertion sites, i e  indwelling lines, tubes, and drains  - Monitor endotracheal (as able) and nasal secretions for changes in amount and color  - [place] appropriate cooling/warming therapies per order  - Administer medications as ordered  - Instruct and encourage patient and family to use good hand hygiene technique  - Identify and instruct in appropriate isolation precautions for identified infection/condition   Outcome: Progressing    Goal: Absence of fever/infection during neutropenic period  INTERVENTIONS:  - Monitor WBC  - Implement neutropenic guidelines   Outcome: Progressing      Problem: SAFETY ADULT  Goal: Patient will remain free of falls  INTERVENTIONS:  - Assess patient frequently for physical needs  -  Identify cognitive and physical deficits and behaviors that affect risk of falls    -  [place] fall precautions as indicated by assessment   - Educate patient/family on patient safety including physical limitations  - Instruct patient to call for assistance with activity based on assessment  - Modify environment to reduce risk of injury  - Consider OT/PT consult to assist with strengthening/mobility   Outcome: Progressing    Goal: Maintain or return to baseline ADL function  INTERVENTIONS:  -  Assess patient's ability to carry out ADLs; assess patient's baseline for ADL function and identify physical deficits which impact ability to perform ADLs (bathing, care of mouth/teeth, toileting, grooming, dressing, etc )  - Assess/evaluate cause of self-care deficits   - Assess range of motion  - Assess patient's mobility; develop plan if impaired  - Assess patient's need for assistive devices and provide as appropriate  - Encourage maximum independence but intervene and supervise when necessary  ¯ Involve family in performance of ADLs  ¯ Assess for home care needs following discharge   ¯ Request OT consult to assist with ADL evaluation and planning for discharge  ¯ Provide patient education as appropriate   Outcome: Progressing    Goal: Maintain or return mobility status to optimal level  INTERVENTIONS:  - Assess patient's baseline mobility status (ambulation, transfers, stairs, etc )    - Identify cognitive and physical deficits and behaviors that affect mobility  - Identify mobility aids required to assist with transfers and/or ambulation (gait belt, sit-to-stand, lift, walker, cane, etc )  - [place] fall precautions as indicated by assessment  - Record patient progress and toleration of activity level on Mobility SBAR; progress patient to next Phase/Stage  - Instruct patient to call for assistance with activity based on assessment  - Request Rehabilitation consult to assist with strengthening/weightbearing, etc    Outcome: Progressing      Problem: Knowledge Deficit  Goal: Patient/family/caregiver demonstrates understanding of disease process, treatment plan, medications, and discharge instructions  Complete learning assessment and assess knowledge base    Interventions:  - Provide teaching at level of understanding  - Provide teaching via preferred learning methods   Outcome: Progressing      Problem: DISCHARGE PLANNING  Goal: Discharge to home or other facility with appropriate resources  INTERVENTIONS:  - Identify barriers to discharge w/patient and caregiver  - Arrange for needed discharge resources and transportation as appropriate  - Identify discharge learning needs (meds, wound care, etc )  - Arrange for interpretive services to assist at discharge as needed  - Refer to Case Management Department for coordinating discharge planning if the patient needs post-hospital services based on physician/advanced practitioner order or complex needs related to functional status, cognitive ability, or social support system   Outcome: Progressing

## 2018-05-26 RX ORDER — DOXYCYCLINE HYCLATE 50 MG/1
324 CAPSULE, GELATIN COATED ORAL
Status: DISCONTINUED | OUTPATIENT
Start: 2018-05-26 | End: 2018-05-27 | Stop reason: HOSPADM

## 2018-05-26 RX ORDER — MULTIVIT WITH MINERALS/LUTEIN
250 TABLET ORAL 2 TIMES DAILY
Status: DISCONTINUED | OUTPATIENT
Start: 2018-05-26 | End: 2018-05-27 | Stop reason: HOSPADM

## 2018-05-26 RX ORDER — IBUPROFEN 600 MG/1
600 TABLET ORAL EVERY 6 HOURS SCHEDULED
Status: DISCONTINUED | OUTPATIENT
Start: 2018-05-26 | End: 2018-05-27 | Stop reason: HOSPADM

## 2018-05-26 RX ADMIN — OXYCODONE HYDROCHLORIDE AND ACETAMINOPHEN 2 TABLET: 5; 325 TABLET ORAL at 07:50

## 2018-05-26 RX ADMIN — ASCORBIC ACID TAB 250 MG 250 MG: 250 TAB at 12:12

## 2018-05-26 RX ADMIN — FERROUS GLUCONATE 324 MG: 324 TABLET ORAL at 12:12

## 2018-05-26 RX ADMIN — ASCORBIC ACID TAB 250 MG 250 MG: 250 TAB at 18:07

## 2018-05-26 RX ADMIN — OXYCODONE HYDROCHLORIDE AND ACETAMINOPHEN 2 TABLET: 5; 325 TABLET ORAL at 21:16

## 2018-05-26 RX ADMIN — SIMETHICONE CHEW TAB 80 MG 80 MG: 80 TABLET ORAL at 18:12

## 2018-05-26 RX ADMIN — IBUPROFEN 600 MG: 600 TABLET ORAL at 14:59

## 2018-05-26 RX ADMIN — IBUPROFEN 600 MG: 600 TABLET ORAL at 23:09

## 2018-05-26 RX ADMIN — DOCUSATE SODIUM 100 MG: 100 CAPSULE, LIQUID FILLED ORAL at 18:07

## 2018-05-26 RX ADMIN — DOCUSATE SODIUM 100 MG: 100 CAPSULE, LIQUID FILLED ORAL at 07:50

## 2018-05-26 RX ADMIN — SIMETHICONE CHEW TAB 80 MG 80 MG: 80 TABLET ORAL at 09:07

## 2018-05-26 RX ADMIN — OXYCODONE HYDROCHLORIDE AND ACETAMINOPHEN 2 TABLET: 5; 325 TABLET ORAL at 16:19

## 2018-05-26 RX ADMIN — IBUPROFEN 600 MG: 600 TABLET ORAL at 09:05

## 2018-05-26 RX ADMIN — OXYCODONE HYDROCHLORIDE AND ACETAMINOPHEN 2 TABLET: 5; 325 TABLET ORAL at 03:44

## 2018-05-26 RX ADMIN — OXYCODONE HYDROCHLORIDE AND ACETAMINOPHEN 2 TABLET: 5; 325 TABLET ORAL at 12:15

## 2018-05-26 NOTE — PROGRESS NOTES
Progress Note - OB/GYN   Eric Begum 29 y o  female MRN: [id number]  Unit/Bed#: L&D 312-01 Encounter: [id number]    Assessment:  POD3 s/p repeat classical c/s with separate low transverse incision, acute blood loss anemia, s/p 2 u prbcs  Pt feeling much better today, OOB and +flatus yesterday  Plan:  Encourage ambulation to expel gas, continue analgesics around the clock, cont colace, start iron/vit c, cont pp care; expect d/c 5/27  Subjective/Objective     Subjective: Pt doing well, missed am motrin dose and had increased burning pain RLQ this am, improved with ice, but ambulating, + flatus yesterday, no BM  neck pressure gone  [place] diet, voiding, nursing  Objective: [place] female, NAD  Abd still soft and distended, abdominal binder loosely applied  Fundus firm, below umbilicus, incision c/d/I  Ext tr edema, non tender  Vitals: Blood pressure 107/68, pulse 96, temperature 97 8 °F (36 6 °C), temperature source Oral, resp  rate 18, height 5' 4" (1 626 m), weight 77 1 kg (170 lb), SpO2 99 %, currently breastfeeding  ,Body mass index is 29 18 kg/m²        Intake/Output Summary (Last 24 hours) at 05/26/18 0904  Last data filed at 05/25/18 2300   Gross per 24 hour   Intake              500 ml   Output             1800 ml   Net            -1300 ml       Invasive Devices     Peripheral Intravenous Line            Peripheral IV 05/24/18 Right Hand 1 day                Physical Exam: see above    Lab, Imaging and other studies: none

## 2018-05-26 NOTE — PROGRESS NOTES
Progress Note - OB/GYN   Wilian Bourne 29 y o  female MRN: [id number]  Unit/Bed#: L&D 312-01 Encounter: [id number]    Assessment:  PP/POD#3 s/p Classical  Section, stable    Plan:  1  Routine post op care             -pain improved this am  2  Acute blood loss anemia 2/2 PPH EBL 1500cc intraop              - S/P transfusion of 2 PRBCs              - benign abdominal exam, appropriately tender, +BS  3  Rh Negative              -RhoGAM ordered      Subjective/Objective   Chief Complaint:     PP/POD#3 s/p Classical  Section    Subjective:     Pain: yes  Tolerating PO: yes  Voiding: yes  Flatus: yes  BM: no  Ambulating: yes  Breastfeeding: Breastfeeding  Chest pain: no  Shortness of breath: no  Leg pain: no  Lochia: minimal    Objective:     Vitals:  Vitals:    18 1500 18 1930 18 2300 18 0734   BP: 92/69 107/68 100/70 107/68   BP Location:   Right arm Right arm   Pulse: 101 98 89 96   Resp: 17  18 18   Temp: 98 9 °F (37 2 °C) 98 °F (36 7 °C) (!) 97 °F (36 1 °C) 97 8 °F (36 6 °C)   TempSrc: Oral Oral Oral Oral   SpO2: 98%  99% 99%   Weight:       Height:           Physical Exam:     Physical Exam   GEN: NAD  Lungs: CTAB  CV:RRR  Abdomen: soft, non tender, non distended; incision clean, dry, and intact  Ext: non tender  Uterine fundus firm and non-tender, at the umbilicus       Lab, Imaging and other studies: I have personally reviewed pertinent reports        Lab Results   Component Value Date    WBC 11 30 (H) 2018    HGB 7 7 (L) 2018    HCT 23 5 (L) 2018    MCV 84 2018     (L) 2018               Amelia Castro MD  18

## 2018-05-26 NOTE — LACTATION NOTE
This note was copied from a baby's chart  Met with mother to go over feeding log since birth for the first week  Emphasized 8 or more (12) feedings in a 24 hour period, what to expect for the number of diapers per day of life and the progression of properties of the  stooling pattern  Discussed s/s that breastfeeding is going well after day 4 and when to get help from a pediatrician or lactation support person after day 4  Booklet included Breast Pumping Instructions, When You Go Back to Work or School, and Breastfeeding Resources for after discharge including access to the number for the [employer]  Mother verbalized breastfeeding is going well  Enc to call for assistance as needed,phone # given

## 2018-05-27 VITALS
SYSTOLIC BLOOD PRESSURE: 100 MMHG | WEIGHT: 170 LBS | OXYGEN SATURATION: 98 % | DIASTOLIC BLOOD PRESSURE: 74 MMHG | HEIGHT: 64 IN | TEMPERATURE: 98.3 F | BODY MASS INDEX: 29.02 KG/M2 | RESPIRATION RATE: 18 BRPM | HEART RATE: 93 BPM

## 2018-05-27 PROBLEM — Z34.90 TERM PREGNANCY: Status: RESOLVED | Noted: 2018-05-23 | Resolved: 2018-05-27

## 2018-05-27 PROBLEM — Z98.891 HISTORY OF CESAREAN SECTION, CLASSICAL: Status: RESOLVED | Noted: 2018-01-26 | Resolved: 2018-05-27

## 2018-05-27 PROBLEM — D25.9 UTERINE FIBROIDS AFFECTING PREGNANCY, THIRD TRIMESTER: Status: RESOLVED | Noted: 2018-05-04 | Resolved: 2018-05-27

## 2018-05-27 PROBLEM — N93.9 VAGINAL BLEEDING: Status: RESOLVED | Noted: 2017-04-25 | Resolved: 2018-05-27

## 2018-05-27 PROBLEM — O34.13 UTERINE FIBROIDS AFFECTING PREGNANCY, THIRD TRIMESTER: Status: RESOLVED | Noted: 2018-05-04 | Resolved: 2018-05-27

## 2018-05-27 RX ADMIN — OXYCODONE HYDROCHLORIDE AND ACETAMINOPHEN 2 TABLET: 5; 325 TABLET ORAL at 02:28

## 2018-05-27 RX ADMIN — DOCUSATE SODIUM 100 MG: 100 CAPSULE, LIQUID FILLED ORAL at 07:58

## 2018-05-27 RX ADMIN — OXYCODONE HYDROCHLORIDE AND ACETAMINOPHEN 2 TABLET: 5; 325 TABLET ORAL at 07:59

## 2018-05-27 RX ADMIN — IBUPROFEN 600 MG: 600 TABLET ORAL at 05:12

## 2018-05-27 RX ADMIN — FERROUS GLUCONATE 324 MG: 324 TABLET ORAL at 07:59

## 2018-05-27 RX ADMIN — ASCORBIC ACID TAB 250 MG 250 MG: 250 TAB at 07:59

## 2018-05-27 RX ADMIN — OXYCODONE HYDROCHLORIDE AND ACETAMINOPHEN 2 TABLET: 5; 325 TABLET ORAL at 12:06

## 2018-05-31 LAB — PLACENTA IN STORAGE: NORMAL

## 2018-09-21 ENCOUNTER — APPOINTMENT (EMERGENCY)
Dept: RADIOLOGY | Facility: HOSPITAL | Age: 33
End: 2018-09-21
Payer: COMMERCIAL

## 2018-09-21 ENCOUNTER — HOSPITAL ENCOUNTER (EMERGENCY)
Facility: HOSPITAL | Age: 33
Discharge: HOME | End: 2018-09-21
Attending: EMERGENCY MEDICINE | Admitting: EMERGENCY MEDICINE
Payer: COMMERCIAL

## 2018-09-21 VITALS
SYSTOLIC BLOOD PRESSURE: 104 MMHG | OXYGEN SATURATION: 100 % | TEMPERATURE: 98.3 F | RESPIRATION RATE: 16 BRPM | DIASTOLIC BLOOD PRESSURE: 72 MMHG | HEART RATE: 76 BPM | BODY MASS INDEX: 29.02 KG/M2 | WEIGHT: 169.97 LBS | HEIGHT: 64 IN

## 2018-09-21 DIAGNOSIS — N93.9 VAGINAL BLEEDING: Primary | ICD-10-CM

## 2018-09-21 LAB
ABO GROUP BLD: NORMAL
ALBUMIN SERPL BCP-MCNC: 3.9 G/DL (ref 3.5–5)
ALP SERPL-CCNC: 58 U/L (ref 46–116)
ALT SERPL W P-5'-P-CCNC: 19 U/L (ref 12–78)
ANION GAP SERPL CALCULATED.3IONS-SCNC: 7 MMOL/L (ref 4–13)
AST SERPL W P-5'-P-CCNC: 14 U/L (ref 5–45)
ATRIAL RATE: 72 BPM
BACTERIA UR QL AUTO: ABNORMAL /HPF
BASOPHILS # BLD AUTO: 0.03 THOUSANDS/ΜL (ref 0–0.1)
BASOPHILS NFR BLD AUTO: 1 % (ref 0–1)
BILIRUB SERPL-MCNC: 0.41 MG/DL (ref 0.2–1)
BILIRUB UR QL STRIP: NEGATIVE
BLD GP AB SCN SERPL QL: NEGATIVE
BUN SERPL-MCNC: 11 MG/DL (ref 5–25)
CALCIUM SERPL-MCNC: 9.3 MG/DL (ref 8.3–10.1)
CHLORIDE SERPL-SCNC: 106 MMOL/L (ref 100–108)
CLARITY UR: ABNORMAL
CO2 SERPL-SCNC: 25 MMOL/L (ref 21–32)
COLOR UR: ABNORMAL
COLOR, POC: NORMAL
CREAT SERPL-MCNC: 0.67 MG/DL (ref 0.6–1.3)
EOSINOPHIL # BLD AUTO: 0.05 THOUSAND/ΜL (ref 0–0.61)
EOSINOPHIL NFR BLD AUTO: 1 % (ref 0–6)
ERYTHROCYTE [DISTWIDTH] IN BLOOD BY AUTOMATED COUNT: 15.9 % (ref 11.6–15.1)
EXT PREG TEST URINE: NEGATIVE
GFR SERPL CREATININE-BSD FRML MDRD: 118 ML/MIN/1.73SQ M
GLUCOSE SERPL-MCNC: 80 MG/DL (ref 65–140)
GLUCOSE UR STRIP-MCNC: NEGATIVE MG/DL
HCT VFR BLD AUTO: 35.3 % (ref 34.8–46.1)
HGB BLD-MCNC: 10.8 G/DL (ref 11.5–15.4)
HGB UR QL STRIP.AUTO: ABNORMAL
HYALINE CASTS #/AREA URNS LPF: ABNORMAL /LPF
IMM GRANULOCYTES # BLD AUTO: 0.02 THOUSAND/UL (ref 0–0.2)
IMM GRANULOCYTES NFR BLD AUTO: 0 % (ref 0–2)
KETONES UR STRIP-MCNC: NEGATIVE MG/DL
LEUKOCYTE ESTERASE UR QL STRIP: NEGATIVE
LYMPHOCYTES # BLD AUTO: 2.02 THOUSANDS/ΜL (ref 0.6–4.47)
LYMPHOCYTES NFR BLD AUTO: 40 % (ref 14–44)
MCH RBC QN AUTO: 22.8 PG (ref 26.8–34.3)
MCHC RBC AUTO-ENTMCNC: 30.6 G/DL (ref 31.4–37.4)
MCV RBC AUTO: 75 FL (ref 82–98)
MONOCYTES # BLD AUTO: 0.29 THOUSAND/ΜL (ref 0.17–1.22)
MONOCYTES NFR BLD AUTO: 6 % (ref 4–12)
NEUTROPHILS # BLD AUTO: 2.67 THOUSANDS/ΜL (ref 1.85–7.62)
NEUTS SEG NFR BLD AUTO: 52 % (ref 43–75)
NITRITE UR QL STRIP: NEGATIVE
NON-SQ EPI CELLS URNS QL MICRO: ABNORMAL /HPF
NRBC BLD AUTO-RTO: 0 /100 WBCS
P AXIS: 69 DEGREES
PH UR STRIP.AUTO: 7.5 [PH] (ref 4.5–8)
PLATELET # BLD AUTO: 274 THOUSANDS/UL (ref 149–390)
PMV BLD AUTO: 10.4 FL (ref 8.9–12.7)
POTASSIUM SERPL-SCNC: 3.9 MMOL/L (ref 3.5–5.3)
PR INTERVAL: 154 MS
PROT SERPL-MCNC: 7.9 G/DL (ref 6.4–8.2)
PROT UR STRIP-MCNC: ABNORMAL MG/DL
QRS AXIS: 80 DEGREES
QRSD INTERVAL: 82 MS
QT INTERVAL: 368 MS
QTC INTERVAL: 402 MS
RBC # BLD AUTO: 4.74 MILLION/UL (ref 3.81–5.12)
RBC #/AREA URNS AUTO: ABNORMAL /HPF
RH BLD: NEGATIVE
SODIUM SERPL-SCNC: 138 MMOL/L (ref 136–145)
SP GR UR STRIP.AUTO: 1.02 (ref 1–1.03)
SPECIMEN EXPIRATION DATE: NORMAL
T WAVE AXIS: 49 DEGREES
TROPONIN I SERPL-MCNC: <0.02 NG/ML
UROBILINOGEN UR QL STRIP.AUTO: 0.2 E.U./DL
VENTRICULAR RATE: 72 BPM
WBC # BLD AUTO: 5.08 THOUSAND/UL (ref 4.31–10.16)
WBC #/AREA URNS AUTO: ABNORMAL /HPF

## 2018-09-21 PROCEDURE — 81001 URINALYSIS AUTO W/SCOPE: CPT

## 2018-09-21 PROCEDURE — 93010 ELECTROCARDIOGRAM REPORT: CPT | Performed by: INTERNAL MEDICINE

## 2018-09-21 PROCEDURE — 85025 COMPLETE CBC W/AUTO DIFF WBC: CPT | Performed by: EMERGENCY MEDICINE

## 2018-09-21 PROCEDURE — 86901 BLOOD TYPING SEROLOGIC RH(D): CPT | Performed by: EMERGENCY MEDICINE

## 2018-09-21 PROCEDURE — 86850 RBC ANTIBODY SCREEN: CPT | Performed by: EMERGENCY MEDICINE

## 2018-09-21 PROCEDURE — 84484 ASSAY OF TROPONIN QUANT: CPT | Performed by: EMERGENCY MEDICINE

## 2018-09-21 PROCEDURE — 80053 COMPREHEN METABOLIC PANEL: CPT | Performed by: EMERGENCY MEDICINE

## 2018-09-21 PROCEDURE — 36415 COLL VENOUS BLD VENIPUNCTURE: CPT

## 2018-09-21 PROCEDURE — 99284 EMERGENCY DEPT VISIT MOD MDM: CPT

## 2018-09-21 PROCEDURE — 81025 URINE PREGNANCY TEST: CPT | Performed by: EMERGENCY MEDICINE

## 2018-09-21 PROCEDURE — 86900 BLOOD TYPING SEROLOGIC ABO: CPT | Performed by: EMERGENCY MEDICINE

## 2018-09-21 PROCEDURE — 93005 ELECTROCARDIOGRAM TRACING: CPT

## 2018-09-21 NOTE — DISCHARGE INSTRUCTIONS
Postpartum Bleeding   WHAT YOU NEED TO KNOW:   Postpartum bleeding is vaginal bleeding after childbirth  This bleeding is normal, whether your baby was born vaginally or by   It contains blood and the tissue that lined the inside of your uterus when you were pregnant  DISCHARGE INSTRUCTIONS:   What to expect with postpartum bleeding:  Postpartum bleeding usually lasts at least 10 days, and may last longer than 6 weeks  Your bleeding may range from light (barely staining a pad) to heavy (soaking a pad in 1 hour)  Usually, you have heavier bleeding right after childbirth, which slows over the next few weeks until it stops  The bleeding is red or dark brown with clots for the first 1 to 3 days  It then turns pink for several days, and then becomes a white or yellow discharge until it ends  Follow up with your obstetrician as directed:  Do not have sex until your obstetrician says it is okay  Write down your questions so you remember to ask them during your visits  Contact your healthcare provider or obstetrician if:   · Your bleeding increases, or you have heavy bleeding that soaks a pad in 1 hour for 2 hours in a row  · You pass large blood clots  · You are breathing faster than normal, or your heart is beating faster than normal     · You are urinating less than usual, or not at all  · You feel dizzy  · You have questions or concerns about your condition or care  Seek immediate care or call 911 if:   · You are suddenly short of breath and feel lightheaded  · You have sudden chest pain  ©  26[address] [employer] Information is for End User's use only and may not be sold, redistributed or otherwise used for commercial purposes  All illustrations and images included in CareNotes® are the copyrighted property of [employer]  or Nawaf Jimenez  The above information is an  only   It is not intended as medical advice for individual conditions or treatments  Talk to your doctor, nurse or pharmacist before following any medical regimen to see if it is safe and effective for you

## 2018-09-21 NOTE — ED PROVIDER NOTES
History  Chief Complaint   Patient presents with    Dizziness     patient has been vaginally bleeding since July  Feeling dizzy recently  Patient is 4 months post-partum  Had a fibroid embolization about a month ago  This is a 51-year-old female who presents with lightheadedness, vaginal bleeding  The patient states that her lightheadedness started at approximately 7:00 p m  yesterday after giving 1 of her children a bath  The patient sat down on the edge of the bathtub, for several minutes, and then had relief for symptoms, however has been persistently mildly lightheaded since then  The patient denies any fevers, chills, cough, sputum production, chest pain, palpitations, syncope, abdominal pain, nausea, vomiting, diarrhea, urinary symptoms  The patient does endorse vaginal bleeding, which has been chronic for her since   In fact, she has been dealing with persistent vaginal bleeding for over a year  She states that she has been saturating pads every 2 hours since July  She has been evaluated persistently by her gynecologist for this bleeding, and has found to have a large fibroid  She is on Depo, and also received embolization of the fibroid approximately a month ago, but with no relief of her vaginal bleeding  The patient did have a classical  section for delivery of her most recent child in May of this year due to her uterine fibroid            Prior to Admission Medications   Prescriptions Last Dose Informant Patient Reported? Taking?    Iron Combinations (IRON COMPLEX PO)  Self Yes Yes   Sig: Take by mouth   Prenatal Vit-Iron Carbonyl-FA (PRENATAL MULTIVITAMIN) TABS  Self Yes Yes   Sig: Take 1 tablet by mouth daily      Facility-Administered Medications: None       Past Medical History:   Diagnosis Date    Anemia     Fibroid     Varicella     Visual impairment        Past Surgical History:   Procedure Laterality Date     SECTION      MI  DELIVERY ONLY N/A 2018    Procedure:  SECTION (); Surgeon: Quirino Brand MD;  Location: [facility];  Service: Obstetrics    TONSILLECTOMY      WISDOM TOOTH EXTRACTION         History reviewed  No pertinent family history  I have reviewed and agree with the history as documented  Social History   Substance Use Topics    Smoking status: Never Smoker    Smokeless tobacco: Never Used    Alcohol use No        Review of Systems   Constitutional: Negative for chills and fever  HENT: Negative for congestion and rhinorrhea  Eyes: Negative for photophobia and visual disturbance  Respiratory: Negative for cough and shortness of breath  Cardiovascular: Negative for chest pain and palpitations  Gastrointestinal: Negative for abdominal pain, diarrhea, nausea and vomiting  Genitourinary: Positive for vaginal bleeding  Negative for dysuria and frequency  Musculoskeletal: Negative for neck pain and neck stiffness  Skin: Negative for pallor and rash  Neurological: Positive for light-headedness  Negative for headaches  All other systems reviewed and are negative  Physical Exam  ED Triage Vitals   Temperature Pulse Respirations Blood Pressure SpO2   18 1145 18 1144 18 1144 18 1144 18 1144   98 3 °F (36 8 °C) 75 16 110/61 99 %      Temp Source Heart Rate Source Patient Position - Orthostatic VS BP Location FiO2 (%)   18 1145 18 1200 18 1144 18 1144 --   Oral Monitor Sitting Right arm       Pain Score       18 1144       4           Orthostatic Vital Signs  Vitals:    18 1315 18 1422 18 1519 18 1630   BP: 105/66 103/58 102/61 104/72   Pulse: 72 68 65 76   Patient Position - Orthostatic VS: Sitting Sitting Sitting Sitting       Physical Exam   Constitutional: She is oriented to person, place, and time  Awake alert, well-appearing, no acute distress  Nontoxic in appearance    Appears stated age   HENT:   Head: Normocephalic and atraumatic  Mouth/Throat: Oropharynx is clear and moist  No oropharyngeal exudate  Eyes: Pupils are equal, round, and reactive to light  No scleral icterus  Neck: Normal range of motion  No JVD present  Cardiovascular: Normal rate, regular rhythm and normal heart sounds  No murmur heard  Pulmonary/Chest: Effort normal  No respiratory distress  She has no wheezes  She has no rales  Abdominal: Soft  She exhibits no distension  There is no tenderness  Genitourinary:   Genitourinary Comments: External vaginal exam normal  Pelvic exam is significant for mild oozing at the cervical os, with minimal blood in the vaginal vault  No lacerations appreciated  Musculoskeletal: Normal range of motion  She exhibits no edema  Neurological: She is alert and oriented to person, place, and time  She exhibits normal muscle tone  Skin: Skin is warm and dry  No rash noted  No pallor         ED Medications  Medications - No data to display    Diagnostic Studies  Results Reviewed     Procedure Component Value Units Date/Time    Urine Microscopic [[id number]]  (Abnormal) Collected:  09/21/18 1314    Lab Status:  Final result Specimen:  Urine from Urine, Other Updated:  09/21/18 1407     RBC, UA Innumerable (A) /hpf      WBC, UA 4-10 (A) /hpf      Epithelial Cells None Seen /hpf      Bacteria, UA None Seen /hpf      Hyaline Casts, UA 3-5 (A) /lpf     POCT pregnancy, urine [[id number]]  (Normal) Resulted:  09/21/18 1319    Lab Status:  Final result Updated:  09/21/18 1319     EXT PREG TEST UR (Ref: Negative) NEGATIVE    POCT urinalysis dipstick [[id number]]  (Normal) Resulted:  09/21/18 1319    Lab Status:  Final result Updated:  09/21/18 1319     Color, UA (SEE RESULTS)    ED Urine Macroscopic [[id number]]  (Abnormal) Collected:  09/21/18 1314    Lab Status:  Final result Specimen:  Urine Updated:  09/21/18 1314     Color, UA Bloody     Clarity, UA Cloudy     pH, UA 7 5     Leukocytes, UA Negative     Nitrite, UA Negative     Protein, UA 30 (1+) (A) mg/dl      Glucose, UA Negative mg/dl      Ketones, UA Negative mg/dl      Urobilinogen, UA 0 2 E U /dl      Bilirubin, UA Negative     Blood, UA Large (A)     Specific [place], UA 1 025    Narrative:       CLINITEK RESULT    Troponin I [[id number]]  (Normal) Collected:  09/21/18 1237    Lab Status:  Final result Specimen:  Blood from Arm, Right Updated:  09/21/18 1314     Troponin I <0 02 ng/mL     Comprehensive metabolic panel [[id number]] Collected:  09/21/18 1237    Lab Status:  Final result Specimen:  Blood from Arm, Right Updated:  09/21/18 1311     Sodium 138 mmol/L      Potassium 3 9 mmol/L      Chloride 106 mmol/L      CO2 25 mmol/L      ANION GAP 7 mmol/L      BUN 11 mg/dL      Creatinine 0 67 mg/dL      Glucose 80 mg/dL      Calcium 9 3 mg/dL      AST 14 U/L      ALT 19 U/L      Alkaline Phosphatase 58 U/L      Total Protein 7 9 g/dL      Albumin 3 9 g/dL      Total Bilirubin 0 41 mg/dL      eGFR 118 ml/min/1 73sq m     Narrative:         National Kidney Disease Education Program recommendations are as follows:  GFR calculation is accurate only with a steady state creatinine  Chronic Kidney disease less than 60 ml/min/1 73 sq  meters  Kidney failure less than 15 ml/min/1 73 sq  meters      CBC and differential [[id number]]  (Abnormal) Collected:  09/21/18 1237    Lab Status:  Final result Specimen:  Blood from Arm, Right Updated:  09/21/18 1247     WBC 5 08 Thousand/uL      RBC 4 74 Million/uL      Hemoglobin 10 8 (L) g/dL      Hematocrit 35 3 %      MCV 75 (L) fL      MCH 22 8 (L) pg      MCHC 30 6 (L) g/dL      RDW 15 9 (H) %      MPV 10 4 fL      Platelets 843 Thousands/uL      nRBC 0 /100 WBCs      Neutrophils Relative 52 %      Immat GRANS % 0 %      Lymphocytes Relative 40 %      Monocytes Relative 6 %      Eosinophils Relative 1 %      Basophils Relative 1 %      Neutrophils Absolute 2 67 Thousands/µL      Immature Grans Absolute 0 02 Thousand/uL      Lymphocytes Absolute 2 02 Thousands/µL      Monocytes Absolute 0 29 Thousand/µL      Eosinophils Absolute 0 05 Thousand/µL      Basophils Absolute 0 03 Thousands/µL                  No orders to display         Procedures  ECG 12 Lead Documentation  Date/Time: 9/22/2018 2:14 PM  Performed by: Lindsay Found  Authorized by: Lindsay Jansen     Patient location:  ED  Previous ECG:     Previous ECG:  Compared to current    Similarity:  No change  Interpretation:     Interpretation: normal    Comments:      Normal sinus rhythm with a heart rate of 73  Normal axis, normal intervals  No ST/T-wave changes  No significant changes on comparison to prior EKG          Phone Consults  ED Phone Contact    ED Course                               MDM  Number of Diagnoses or Management Options  Vaginal bleeding:   Diagnosis management comments: This is a 74-year-old female who presents after an episode of lightheadedness in the setting of long-term vaginal bleeding, concerning for symptomatic anemia  On arrival, the patient is hemodynamically stable, well-appearing, and does not appear to be in acute distress  On exam, she is not pale, and has brisk capillary refill  Her cardiopulmonary exam is unremarkable  She does appear to have a small amount of dried blood in her pelvic region, as well as on her external vagina, but internal pelvic exam was relatively unremarkable, with only a mild amount of venous oozing from the cervix, as well as minimal blood in the vaginal vault  The rest her physical exam is unremarkable   -initial lab work significant for hemoglobin of 10 8, which appears to be improved from the patient's last reading  Urine pregnancy was negative  Urinalysis was unremarkable   -chest x-ray and EKG unremarkable  Cardiogenic etiology of her symptoms appears less likely  -I discussed this case with the patient's obstetrician, Dr James Stearns     We discussed the patient's laboratory findings, and agree the patient is appropriate for discharge home  She will be instructed to call her obstetrician's office to schedule an appointment for re-evaluation next week on an outpatient basis  -plan is for discharge home  Strict return precautions provided  CritCare Time    Disposition  Final diagnoses:   Vaginal bleeding     Time reflects when diagnosis was documented in both MDM as applicable and the Disposition within this note     Time User Action Codes Description Comment    9/21/2018  4:28 PM Sally Esquivel [N93 9] Vaginal bleeding       ED Disposition     ED Disposition Condition Comment    Discharge  Markie Warren discharge to home/self care  Condition at discharge: Good        Follow-up Information     Follow up With Specialties Details Why [address] [place] [address] [address]  [phone]      Prieto Grant MD Obstetrics and Gynecology, Obstetrics, Gynecology Call in 1 day  [address] [place] [address]  [phone]            Discharge Medication List as of 9/21/2018  4:29 PM      CONTINUE these medications which have NOT CHANGED    Details   Iron Combinations (IRON COMPLEX PO) Take by mouth, Historical Med      Prenatal Vit-Iron Carbonyl-FA (PRENATAL MULTIVITAMIN) TABS Take 1 tablet by mouth daily, Historical Med           No discharge procedures on file  ED Provider  Attending physically available and evaluated Markie Warrne I managed the patient along with the ED Attending      Electronically Signed by         Jessy Hinton MD  09/22/18 [id number]

## 2018-09-21 NOTE — ED ATTENDING ATTESTATION
Orlando Lopez MD, saw and evaluated the patient  I have discussed the patient with the resident/non-physician practitioner and agree with the resident's/non-physician practitioner's findings, Plan of Care, and MDM as documented in the resident's/non-physician practitioner's note, except where noted  All available labs and Radiology studies were reviewed  At this point I agree with the current assessment done in the Emergency Department  I have conducted an independent evaluation of this patient a history and physical is as follows:    Pt started with vaginal bleeding since July 3rd Pt started with worse symptoms with clots thsi weekend Pt had near syncope since last pm  Pt had embolization 1 month ago   PE: alert nad heart reg lungs clear abd soft tender suprapubic area MDM: will check labs discuss ob pelvic    Critical Care Time  CritCare Time    Procedures

## 2019-10-09 ENCOUNTER — OFFICE VISIT (OUTPATIENT)
Dept: INTERNAL MEDICINE CLINIC | Age: 34
End: 2019-10-09
Payer: COMMERCIAL

## 2019-10-09 VITALS
HEART RATE: 80 BPM | OXYGEN SATURATION: 98 % | WEIGHT: 131.2 LBS | DIASTOLIC BLOOD PRESSURE: 76 MMHG | SYSTOLIC BLOOD PRESSURE: 102 MMHG | BODY MASS INDEX: 21.86 KG/M2 | TEMPERATURE: 98.2 F | HEIGHT: 65 IN

## 2019-10-09 DIAGNOSIS — Z23 NEED FOR INFLUENZA VACCINATION: ICD-10-CM

## 2019-10-09 DIAGNOSIS — H65.192 OTHER ACUTE NONSUPPURATIVE OTITIS MEDIA OF LEFT EAR, RECURRENCE NOT SPECIFIED: ICD-10-CM

## 2019-10-09 DIAGNOSIS — J06.9 URTI (ACUTE UPPER RESPIRATORY INFECTION): Primary | ICD-10-CM

## 2019-10-09 PROBLEM — H66.90 ACUTE OTITIS MEDIA: Status: ACTIVE | Noted: 2019-10-09

## 2019-10-09 PROCEDURE — 99202 OFFICE O/P NEW SF 15 MIN: CPT | Performed by: INTERNAL MEDICINE

## 2019-10-09 PROCEDURE — 3008F BODY MASS INDEX DOCD: CPT | Performed by: INTERNAL MEDICINE

## 2019-10-09 RX ORDER — MEDROXYPROGESTERONE ACETATE 150 MG/ML
INJECTION, SUSPENSION INTRAMUSCULAR
Refills: 0 | COMMUNITY
Start: 2019-09-23

## 2019-10-09 RX ORDER — FLUTICASONE PROPIONATE 50 MCG
1 SPRAY, SUSPENSION (ML) NASAL DAILY
Qty: 1 BOTTLE | Refills: 0 | Status: SHIPPED | OUTPATIENT
Start: 2019-10-09 | End: 2022-04-26 | Stop reason: SDUPTHER

## 2019-10-09 RX ORDER — AMOXICILLIN AND CLAVULANATE POTASSIUM 875; 125 MG/1; MG/1
1 TABLET, FILM COATED ORAL EVERY 12 HOURS SCHEDULED
Qty: 14 TABLET | Refills: 0 | Status: SHIPPED | OUTPATIENT
Start: 2019-10-09 | End: 2019-10-16

## 2019-10-09 RX ORDER — GUAIFENESIN 600 MG
600 TABLET, EXTENDED RELEASE 12 HR ORAL EVERY 12 HOURS SCHEDULED
Qty: 20 TABLET | Refills: 0 | Status: SHIPPED | OUTPATIENT
Start: 2019-10-09 | End: 2021-05-20 | Stop reason: ALTCHOICE

## 2019-10-09 NOTE — PROGRESS NOTES
Assessment/Plan:    Upper respiratory tract infection with otitis media of the left ear  - very likely bacterial  -will start patient on Augmentin  mg twice daily for 7 days  Patient was counseled to take probiotics with this and to take the medication with meals  Will start patient on Flonase nasal spray for rhinitis and Mucinex for her productive cough  -patient was counseled to use over-the-counter Tylenol or ibuprofen with meals for her aches and pains and she was also counseled to use warm salt water gargles for her sore throat  She was counseled to take her analgesics round the clock - every 6 hours till she improves and then she can take it as needed   -patient should return to the office if her symptoms worsen or do not improve    Need for influenza vaccination  - patient will return for her flu shot after her acute illness    Normal BMI  - BMI is 21 60  - continue with current diet and exercise     Diagnoses and all orders for this visit:    URTI (acute upper respiratory infection)  -     amoxicillin-clavulanate (AUGMENTIN) 875-125 mg per tablet; Take 1 tablet by mouth every 12 (twelve) hours for 7 days  -     fluticasone (FLONASE) 50 mcg/act nasal spray; 1 spray into each nostril daily  -     guaiFENesin (MUCINEX) 600 mg 12 hr tablet; Take 1 tablet (600 mg total) by mouth every 12 (twelve) hours    Other acute nonsuppurative otitis media of left ear, recurrence not specified  -     amoxicillin-clavulanate (AUGMENTIN) 875-125 mg per tablet; Take 1 tablet by mouth every 12 (twelve) hours for 7 days    Other orders  -     medroxyPROGESTERone acetate (DEPO-PROVERA SYRINGE) 150 mg/mL injection; INJECT 1 ML IN THE MUSCLE Q 9 WEEKS          Subjective:      Patient ID: Rj Baeza is a 30 y o  female  HPI  Patient presents for the 1st time to this practice with complaints of sore throat that started about 2 days ago and left-sided ear ache that started yesterday    She denies hearing loss, but admits to a wetness around her left ear this morning which she suspects maybe a discharge  She admits to occasional runny nose and stuffy nose and states that she has a cough that is productive of greenish phlegm  She denies fever, chills, night sweats, chest pain, shortness of breath, palpitations, nausea, vomiting, abdominal pain, diarrhea, constipation, myalgias, arthralgias  Patient admits to history of contact  Her 2 children are sick with the croup  She does not smoke  She has not had a physical in a while and has not had blood work done in about 1 year  She will return to the office later to establish care and to have a physical     The following portions of the patient's history were reviewed and updated as appropriate:   She  has a past medical history of Anemia, Arthritis, Blood type, Rh negative, Breech presentation, Fibroid, Varicella, and Visual impairment  She   Patient Active Problem List    Diagnosis Date Noted    URTI (acute upper respiratory infection) 10/09/2019    Acute otitis media 10/09/2019     She  has a past surgical history that includes Kyle tooth extraction; Tonsillectomy;  section; and pr  delivery only (N/A, 2018)  Her family history includes No Known Problems in her mother  She  reports that she has never smoked  She has never used smokeless tobacco  She reports that she does not drink alcohol or use drugs    Current Outpatient Medications   Medication Sig Dispense Refill    medroxyPROGESTERone acetate (DEPO-PROVERA SYRINGE) 150 mg/mL injection INJECT 1 ML IN THE MUSCLE Q 9 WEEKS  0    amoxicillin-clavulanate (AUGMENTIN) 875-125 mg per tablet Take 1 tablet by mouth every 12 (twelve) hours for 7 days 14 tablet 0    fluticasone (FLONASE) 50 mcg/act nasal spray 1 spray into each nostril daily 1 Bottle 0    guaiFENesin (MUCINEX) 600 mg 12 hr tablet Take 1 tablet (600 mg total) by mouth every 12 (twelve) hours 20 tablet 0     No current facility-administered medications for this visit  Current Outpatient Medications on File Prior to Visit   Medication Sig    medroxyPROGESTERone acetate (DEPO-PROVERA SYRINGE) 150 mg/mL injection INJECT 1 ML IN THE MUSCLE Q 9 WEEKS    [DISCONTINUED] Iron Combinations (IRON COMPLEX PO) Take by mouth    [DISCONTINUED] Prenatal Vit-Iron Carbonyl-FA (PRENATAL MULTIVITAMIN) TABS Take 1 tablet by mouth daily     No current facility-administered medications on file prior to visit  She is allergic to celecoxib  Gordon Coffman Review of Systems   Constitutional: Negative for activity change, chills, fatigue, fever and unexpected weight change  HENT: Positive for congestion, ear pain (left ear ache with a clogged sensation with a sensation of wetness), rhinorrhea and sore throat  Negative for postnasal drip and sinus pressure  Eyes: Negative for pain  Respiratory: Positive for cough (Productive of greenish phlegm)  Negative for choking, chest tightness, shortness of breath and wheezing  Cardiovascular: Negative for chest pain, palpitations and leg swelling  Gastrointestinal: Negative for abdominal pain, constipation, diarrhea, nausea and vomiting  Genitourinary: Negative for dysuria and hematuria  Musculoskeletal: Negative for arthralgias, back pain, gait problem, joint swelling, myalgias and neck stiffness  Skin: Negative for pallor and rash  Neurological: Negative for dizziness, tremors, seizures, syncope, light-headedness and headaches  Hematological: Negative for adenopathy  Psychiatric/Behavioral: Negative for behavioral problems           Past Medical History:   Diagnosis Date    Anemia     Arthritis     Blood type, Rh negative     resolved 8/19/2015     Breech presentation     Resolved 7/16/2015     Fibroid     Varicella     Visual impairment          Current Outpatient Medications:     medroxyPROGESTERone acetate (DEPO-PROVERA SYRINGE) 150 mg/mL injection, INJECT 1 ML IN THE MUSCLE Q 9 WEEKS, Disp: , Rfl: 0    amoxicillin-clavulanate (AUGMENTIN) 875-125 mg per tablet, Take 1 tablet by mouth every 12 (twelve) hours for 7 days, Disp: 14 tablet, Rfl: 0    fluticasone (FLONASE) 50 mcg/act nasal spray, 1 spray into each nostril daily, Disp: 1 Bottle, Rfl: 0    guaiFENesin (MUCINEX) 600 mg 12 hr tablet, Take 1 tablet (600 mg total) by mouth every 12 (twelve) hours, Disp: 20 tablet, Rfl: 0    Allergies   Allergen Reactions    Celecoxib Rash       Social History   Past Surgical History:   Procedure Laterality Date     SECTION      AK  DELIVERY ONLY N/A 2018    Procedure:  SECTION (); Surgeon: Marisol Don MD;  [facility]: [facility];  Service: Obstetrics    TONSILLECTOMY      WISDOM TOOTH EXTRACTION       Family History   Problem Relation Age of Onset    No Known Problems Mother        Objective:  /76 (BP Location: Left arm, Patient Position: Sitting, Cuff Size: Standard)   Pulse 80   Temp 98 2 °F (36 8 °C) (Tympanic)   Ht 5' 5 35" (1 66 m)   Wt 59 5 kg (131 lb 3 2 oz)   SpO2 98%   BMI 21 60 kg/m²        Physical Exam   Constitutional: She is oriented to person, place, and time  She appears well-developed and well-nourished  No distress  HENT:   Head: Normocephalic and atraumatic  Left Ear: Tympanic membrane is injected, erythematous and bulging  Mouth/Throat: Posterior oropharyngeal edema and posterior oropharyngeal erythema present  No oropharyngeal exudate  Nasal mucosa erythema and edema with swollen turbinates  Severe patchy oropharyngeal erythema and edema, especially worse the left side  Mild external auditory canal erythema and edema and severe left-sided external auditory canal erythema and edema with erythematous and bulging left tympanic membrane   Eyes: Pupils are equal, round, and reactive to light  Conjunctivae and EOM are normal  Right eye exhibits no discharge  Left eye exhibits no discharge  No scleral icterus     Neck: Normal range of motion  Neck supple  No JVD present  No tracheal deviation present  No thyromegaly present  Cardiovascular: Normal rate, regular rhythm, normal heart sounds and intact distal pulses  Exam reveals no gallop and no friction rub  No murmur heard  Pulmonary/Chest: Effort normal and breath sounds normal  No respiratory distress  She has no wheezes  She has no rales  She exhibits no tenderness  Abdominal: Soft  Bowel sounds are normal  She exhibits no distension and no mass  There is no tenderness  There is no rebound and no guarding  Musculoskeletal: Normal range of motion  She exhibits no edema, tenderness or deformity  Lymphadenopathy:     She has cervical adenopathy (Submandibular lymphadenopathy, with tenderness on the left)  Neurological: She is alert and oriented to person, place, and time  She has normal reflexes  No cranial nerve deficit  She exhibits normal muscle tone  Coordination normal    Skin: Skin is warm and dry  No rash noted  She is not diaphoretic  No erythema  No pallor  Psychiatric: She has a normal mood and affect   Her behavior is normal

## 2019-10-09 NOTE — PATIENT INSTRUCTIONS
- Drink plenty of fluids  - Get plenty of rest  - You may use salt water gargles for your sore throat  - You may use over the counter tylenol or Ibuprofen (motrin or Advil) for any headache, muscle aches and fever  - Call the office if your symptoms persist beyond a total of 7-10 days        Otitis Media   AMBULATORY CARE:   Otitis media  is an ear infection  Common symptoms include the following:   · Fever or a headache    · Ear pain    · Trouble hearing    · Ear feels plugged or full or you have ringing or buzzing in your ear    · Dizziness or you lose your balance    · Nausea or vomiting  Seek immediate care for the following symptoms:   · Seizure    · Fever and a stiff neck  Treatment for otitis media  may include any of the following:  · NSAIDs , such as ibuprofen, help decrease swelling, pain, and fever  This medicine is available with or without a doctor's order  NSAIDs can cause stomach bleeding or kidney problems in certain people  If you take blood thinner medicine, always ask your healthcare provider if NSAIDs are safe for you  Always read the medicine label and follow directions  · Ear drops  to help treat your ear pain  · Antibiotics  to help kill the germs that caused your ear infection  Care for otitis media:   · Use heat  Place a warm, moist washcloth on your ear to decrease pain  Apply for 15 to 20 minutes, 3 to 4 times a day    · Use ice  Ice helps decrease swelling and pain  Use an ice pack or put crushed ice in a plastic bag  Cover the ice pack with a towel and place it on your ear for 15 to 20 minutes, 3 to 4 times a day for 2 days  Prevent otitis media:   · Wash your hands often  This will help prevent the spread of germs  Encourage everyone in your house to wash their hands with soap and water after they use the bathroom  Everyone should also wash their hands after they change a child's diaper and before they prepare or eat food  · Stay away from people who are ill    Germs are easily and quickly spread through contact  Follow up with your healthcare provider as directed:  Write down your questions so you remember to ask them during your visits  © 2017 [employer] Information is for End User's use only and may not be sold, redistributed or otherwise used for commercial purposes  All illustrations and images included in CareNotes® are the copyrighted property of [employer]  or [employer]  The above information is an  only  It is not intended as medical advice for individual conditions or treatments  Talk to your doctor, nurse or pharmacist before following any medical regimen to see if it is safe and effective for you  Upper Respiratory Infection   AMBULATORY CARE:   An upper respiratory infection  is also called a common cold  It can affect your nose, throat, ears, and sinuses  Common signs and symptoms include the following:  Cold symptoms are usually worst for the first 3 to 5 days  You may have any of the following:  · Runny or stuffy nose    · Sneezing and coughing    · Sore throat or hoarseness    · Red, watery, and sore eyes    · Fatigue     · Chills and fever    · Headache, body aches, or sore muscles  Seek care immediately if:   · You have chest pain or trouble breathing  Contact your healthcare provider if:   · You have a fever over 102ºF (39°C)  · Your sore throat gets worse or you see white or yellow spots in your throat  · Your symptoms get worse after 3 to 5 days or your cold is not better in 14 days  · You have a rash anywhere on your skin  · You have large, tender lumps in your neck  · You have thick, green or yellow drainage from your nose  · You cough up thick yellow, green, or bloody mucus  · You have vomiting for more than 24 hours and cannot keep fluids down  · You have a bad earache  · You have questions or concerns about your condition or care  Treatment for a cold:   There is no cure for the common cold  Colds are caused by viruses and do not get better with antibiotics  Most people get better in 7 to 14 days  You may continue to cough for 2 to 3 weeks  The following may help decrease your symptoms:  · Decongestants  help reduce nasal congestion and help you breathe more easily  If you take decongestant pills, they may make you feel restless or not able to sleep  Do not use decongestant sprays for more than a few days  · Cough suppressants  help reduce coughing  Ask your healthcare provider which type of cough medicine is best for you  · NSAIDs , such as ibuprofen, help decrease swelling, pain, and fever  NSAIDs can cause stomach bleeding or kidney problems in certain people  If you take blood thinner medicine, always ask your healthcare provider if NSAIDs are safe for you  Always read the medicine label and follow directions  · Acetaminophen  decreases pain and fever  It is available without a doctor's order  Ask how much to take and how often to take it  Follow directions  Read the labels of all other medicines you are using to see if they also contain acetaminophen, or ask your doctor or pharmacist  Acetaminophen can cause liver damage if not taken correctly  Do not use more than 4 grams (4,000 milligrams) total of acetaminophen in one day  Manage your cold:   · Rest as much as possible  Slowly start to do more each day  · Drink more liquids as directed  Liquids will help thin and loosen mucus so you can cough it up  Liquids will also help prevent dehydration  Liquids that help prevent dehydration include water, fruit juice, and broth  Do not drink liquids that contain caffeine  Caffeine can increase your risk for dehydration  Ask your healthcare provider how much liquid to drink each day  · Soothe a sore throat  Gargle with warm salt water  This helps your sore throat feel better  Make salt water by dissolving ¼ teaspoon salt in 1 cup warm water   You may also suck on hard candy or throat lozenges  You may use a sore throat spray  · Use a humidifier or vaporizer  Use a cool mist humidifier or a vaporizer to increase air moisture in your home  This may make it easier for you to breathe and help decrease your cough  · Use saline nasal drops as directed  These help relieve congestion  · Apply petroleum-based jelly around the outside of your nostrils  This can decrease irritation from blowing your nose  · Do not smoke  Nicotine and other chemicals in cigarettes and cigars can make your symptoms worse  They can also cause infections such as bronchitis or pneumonia  Ask your healthcare provider for information if you currently smoke and need help to quit  E-cigarettes or smokeless tobacco still contain nicotine  Talk to your healthcare provider before you use these products  Prevent spreading your cold to others:   · Try to stay away from other people during the first 2 to 3 days of your cold when it is more easily spread  · Do not share food or drinks  · Do not share hand towels with household members  · Wash your hands often, especially after you blow your nose  Turn away from other people and cover your mouth and nose with a tissue when you sneeze or cough  Follow up with your healthcare provider as directed:  Write down your questions so you remember to ask them during your visits  © 2017 [employer]  Information is for End User's use only and may not be sold, redistributed or otherwise used for commercial purposes  All illustrations and images included in CareNotes® are the copyrighted property of [employer]  or [employer]  The above information is an  only  It is not intended as medical advice for individual conditions or treatments  Talk to your doctor, nurse or pharmacist before following any medical regimen to see if it is safe and effective for you

## 2019-11-14 NOTE — PLAN OF CARE
DISCHARGE PLANNING     Discharge to home or other facility with appropriate resources Progressing        INFECTION - ADULT     Absence or prevention of progression during hospitalization Progressing     Absence of fever/infection during neutropenic period Progressing        Knowledge Deficit     Patient/family/caregiver demonstrates understanding of disease process, treatment plan, medications, and discharge instructions Progressing        PAIN - ADULT     Verbalizes/displays adequate comfort level or baseline comfort level Progressing        SAFETY ADULT     Patient will remain free of falls Progressing     Maintain or return to baseline ADL function Progressing     Maintain or return mobility status to optimal level Progressing Hypothyroidism, unspecified type

## 2020-04-30 ENCOUNTER — TELEPHONE (OUTPATIENT)
Dept: INTERNAL MEDICINE CLINIC | Age: 35
End: 2020-04-30

## 2020-08-14 ENCOUNTER — TELEPHONE (OUTPATIENT)
Dept: INTERNAL MEDICINE CLINIC | Facility: CLINIC | Age: 35
End: 2020-08-14

## 2020-08-14 NOTE — TELEPHONE ENCOUNTER
Left message on machine for patient to call me at [facility] office  Pt never seen by Dr Zak Vera yet  Pt only seen by Dr Terri Marr for sick visit 10/9/2019  Pt due for a follow up or physical with Dr Zak Vera  Please schedule

## 2020-09-22 NOTE — TELEPHONE ENCOUNTER
Left message on machine for patient to call me at [place] office  Please send patient letter  She cancelled twice    She needs to make an appt with Dr Naveen Marshall th

## 2020-09-24 NOTE — TELEPHONE ENCOUNTER
Sent letter to patient  Removed Dr Drake Issa as PCP as he has never seen this patient and the patient never established with our office

## 2021-05-20 ENCOUNTER — TELEPHONE (OUTPATIENT)
Dept: INTERNAL MEDICINE CLINIC | Facility: CLINIC | Age: 36
End: 2021-05-20

## 2021-05-20 ENCOUNTER — OFFICE VISIT (OUTPATIENT)
Dept: INTERNAL MEDICINE CLINIC | Facility: CLINIC | Age: 36
End: 2021-05-20
Payer: COMMERCIAL

## 2021-05-20 VITALS
BODY MASS INDEX: 23.22 KG/M2 | HEIGHT: 64 IN | DIASTOLIC BLOOD PRESSURE: 78 MMHG | OXYGEN SATURATION: 98 % | HEART RATE: 71 BPM | WEIGHT: 136 LBS | TEMPERATURE: 98.2 F | SYSTOLIC BLOOD PRESSURE: 100 MMHG

## 2021-05-20 DIAGNOSIS — Z00.00 ENCOUNTER FOR ANNUAL PHYSICAL EXAM: ICD-10-CM

## 2021-05-20 DIAGNOSIS — Z86.39 HX OF IRON DEFICIENCY: ICD-10-CM

## 2021-05-20 DIAGNOSIS — Z13.220 ENCOUNTER FOR LIPID SCREENING FOR CARDIOVASCULAR DISEASE: ICD-10-CM

## 2021-05-20 DIAGNOSIS — R10.9 BILATERAL FLANK PAIN: Primary | ICD-10-CM

## 2021-05-20 DIAGNOSIS — Z13.6 ENCOUNTER FOR SCREENING FOR CARDIOVASCULAR DISORDERS: ICD-10-CM

## 2021-05-20 DIAGNOSIS — Z13.6 ENCOUNTER FOR LIPID SCREENING FOR CARDIOVASCULAR DISEASE: ICD-10-CM

## 2021-05-20 DIAGNOSIS — J30.2 SEASONAL ALLERGIES: ICD-10-CM

## 2021-05-20 PROBLEM — Z23 NEED FOR INFLUENZA VACCINATION: Status: RESOLVED | Noted: 2019-10-09 | Resolved: 2021-05-20

## 2021-05-20 PROBLEM — H66.90 ACUTE OTITIS MEDIA: Status: RESOLVED | Noted: 2019-10-09 | Resolved: 2021-05-20

## 2021-05-20 PROBLEM — J06.9 URTI (ACUTE UPPER RESPIRATORY INFECTION): Status: RESOLVED | Noted: 2019-10-09 | Resolved: 2021-05-20

## 2021-05-20 LAB
SL AMB  POCT GLUCOSE, UA: NEGATIVE
SL AMB LEUKOCYTE ESTERASE,UA: NEGATIVE
SL AMB POCT BILIRUBIN,UA: NEGATIVE
SL AMB POCT BLOOD,UA: NEGATIVE
SL AMB POCT CLARITY,UA: CLEAR
SL AMB POCT COLOR,UA: YELLOW
SL AMB POCT KETONES,UA: NEGATIVE
SL AMB POCT NITRITE,UA: NEGATIVE
SL AMB POCT PH,UA: 5.5
SL AMB POCT SPECIFIC GRAVITY,UA: 1.03
SL AMB POCT URINE PROTEIN: NEGATIVE
SL AMB POCT UROBILINOGEN: 0.2

## 2021-05-20 PROCEDURE — 81003 URINALYSIS AUTO W/O SCOPE: CPT | Performed by: INTERNAL MEDICINE

## 2021-05-20 PROCEDURE — 99395 PREV VISIT EST AGE 18-39: CPT | Performed by: INTERNAL MEDICINE

## 2021-05-20 PROCEDURE — 3725F SCREEN DEPRESSION PERFORMED: CPT | Performed by: INTERNAL MEDICINE

## 2021-05-20 PROCEDURE — 3008F BODY MASS INDEX DOCD: CPT | Performed by: INTERNAL MEDICINE

## 2021-05-20 NOTE — PROGRESS NOTES
Assessment/Plan:    Bilateral flank pain    Will obtain ultrasound of the abdomen and include gallbladder  Hx of iron deficiency    Will recheck a CBC and if necessary follow-up with iron  studies    Seasonal allergies   Continue Flonase as needed OTC decongestants as needed    Encounter for screening for cardiovascular disorders   Will obtain a lipid profile with her lab work  Encounter for annual physical exam    Healthy 28-year-old without any acute issues  History of iron deficiency anemia secondary to abnormal uterine bleeding due to uterine  fibroids which has since been treated  Diagnoses and all orders for this visit:    Bilateral flank pain  -     Amylase; Future  -     CBC and differential; Future  -     Comprehensive metabolic panel; Future  -     US abdomen complete; Future    Hx of iron deficiency  -     Amylase; Future  -     CBC and differential; Future  -     Comprehensive metabolic panel; Future  -     US abdomen complete; Future    Encounter for lipid screening for cardiovascular disease  -     Lipid panel; Future    Encounter for screening for cardiovascular disorders    Seasonal allergies    Encounter for annual physical exam          Subjective:      Patient ID: Shemar Villalobos is a 37 y o  female  28-year-old female presents for a physical examination  She has no acute problems  She does complain of some intermittent bilateral flank pain  Most recently, she noticed the onset of this discomfort after eating a rather large meal at a relative's house  Symptoms gradually subside  Not associated with fevers chills, nausea vomiting, dysuria, diarrhea or other bowel disturbance  She has a history of nephrolithiasis in the past but states that this is a different type of discomfort  She also has a history of iron deficiency anemia secondary to abnormal menstrual bleeding from uterine fibroids    She underwent interventional radiology uterine artery embolization with complete necrosis and resolution of the leiomyoma  Offers no other complaints or concerns  She does have some seasonal allergies  She is taking over-the-counter Flonase as well as some intranasal gel for decongestion but otherwise has no concerns        Family History   Problem Relation Age of Onset    No Known Problems Mother      Social History     Socioeconomic History    Marital status: /Civil Union     Spouse name: Not on file    Number of children: Not on file    Years of education: Not on file    Highest education level: Not on file   Occupational History    Occupation: Employed at [address] [place] [address] [employer] resource strain: Not on file    Food insecurity     Worry: Not on file     Inability: Not on file   [employer] needs     Medical: Not on file     Non-medical: Not on file   Tobacco Use    Smoking status: Current Some Day Smoker    Smokeless tobacco: Never Used    Tobacco comment: rarely only socially   Substance and Sexual Activity    Alcohol use: No    Drug use: No    Sexual activity: Yes     Partners: Male   Lifestyle    Physical activity     Days per week: Not on file     Minutes per session: Not on file    Stress: Not on file   Relationships    Social connections     Talks on phone: Not on file     Gets together: Not on file     Attends Anabaptism service: Not on file     Active member of club or organization: Not on file     Attends meetings of clubs or organizations: Not on file     Relationship status: Not on file    Intimate partner violence     Fear of current or ex partner: Not on file     Emotionally abused: Not on file     Physically abused: Not on file     Forced sexual activity: Not on file   Other Topics Concern    Not on file   Social History Narrative    Denied: History of exercising regularly     Past Medical History:   Diagnosis Date    Acute otitis media 10/9/2019    Anemia     Arthritis     Blood type, Rh negative     resolved 2015     BMI 21 0-21 9, adult 10/9/2019    Breech presentation     Resolved 2015     Fibroid     Need for influenza vaccination 10/9/2019    URTI (acute upper respiratory infection) 10/9/2019    Varicella     Visual impairment        Current Outpatient Medications:     fluticasone (FLONASE) 50 mcg/act nasal spray, 1 spray into each nostril daily, Disp: 1 Bottle, Rfl: 0    medroxyPROGESTERone acetate (DEPO-PROVERA SYRINGE) 150 mg/mL injection, INJECT 1 ML IN THE MUSCLE Q 9 WEEKS, Disp: , Rfl: 0  Allergies   Allergen Reactions    Celecoxib Rash     Past Surgical History:   Procedure Laterality Date     SECTION      [place]  DELIV[place] ONLY N/A 2018    Procedure:  SECTION (); Surgeon: Arnaldo Elder MD;  Location: [facility];  Service: Obstetrics    TONSILLECTOMY      WISDOM TOOTH EXTRACTION           Review of Systems   Constitutional: Negative  HENT: Negative  Eyes: Negative  Respiratory: Negative  Cardiovascular: Negative  Gastrointestinal: Negative  Endocrine: Negative  Genitourinary: Positive for flank pain (Mild, bilateral)  Allergic/Immunologic: Positive for environmental allergies  Neurological: Negative  Hematological: Negative  Psychiatric/Behavioral: Negative  Objective:      /78 (BP Location: Left arm, Patient Position: Sitting, Cuff Size: Adult)   Pulse 71   Temp 98 2 °F (36 8 °C) (Tympanic)   Ht 5' 3 86" (1 622 m)   Wt 61 7 kg (136 lb)   SpO2 98% Comment: Room Air  BMI 23 45 kg/m²          Physical Exam  Vitals signs reviewed  Constitutional:       General: She is not in acute distress  Appearance: Normal appearance  She is normal weight  She is not ill-appearing, toxic-appearing or diaphoretic  HENT:      Head: Normocephalic and atraumatic  Right Ear: External ear normal       Left Ear: External ear normal    Eyes:      General: No scleral icterus       Conjunctiva/sclera: Conjunctivae normal       Pupils: Pupils are equal, round, and reactive to light  Neck:      Musculoskeletal: Normal range of motion and neck supple  Vascular: No carotid bruit  Cardiovascular:      Rate and Rhythm: Normal rate and regular rhythm  Pulses: Normal pulses  Heart sounds: Normal heart sounds  No murmur  Pulmonary:      Effort: Pulmonary effort is normal  No respiratory distress  Breath sounds: Normal breath sounds  Abdominal:      General: Abdomen is flat  Bowel sounds are normal  There is no distension  Palpations: There is no mass  Tenderness: There is no abdominal tenderness  There is no right CVA tenderness, left CVA tenderness, guarding or rebound  Hernia: No hernia is present  Musculoskeletal: Normal range of motion  General: No swelling or tenderness  Right lower leg: No edema  Left lower leg: No edema  Lymphadenopathy:      Cervical: No cervical adenopathy  Skin:     General: Skin is warm  Capillary Refill: Capillary refill takes less than 2 seconds  Coloration: Skin is not jaundiced  Findings: No bruising, erythema or rash  Neurological:      General: No focal deficit present  Mental Status: She is alert and oriented to person, place, and time  Mental status is at baseline  Cranial Nerves: No cranial nerve deficit  Sensory: No sensory deficit  Coordination: Coordination normal    Psychiatric:         Mood and Affect: Mood normal          Behavior: Behavior normal          Thought Content:  Thought content normal          Judgment: Judgment normal

## 2021-05-20 NOTE — ASSESSMENT & PLAN NOTE
Healthy 37year-old without any acute issues  History of iron deficiency anemia secondary to abnormal uterine bleeding due to uterine  fibroids which has since been treated

## 2021-07-16 ENCOUNTER — HOSPITAL ENCOUNTER (OUTPATIENT)
Dept: RADIOLOGY | Age: 36
Discharge: HOME | End: 2021-07-16
Payer: COMMERCIAL

## 2021-07-16 DIAGNOSIS — Z86.39 HX OF IRON DEFICIENCY: ICD-10-CM

## 2021-07-16 DIAGNOSIS — R10.9 BILATERAL FLANK PAIN: ICD-10-CM

## 2021-07-16 PROCEDURE — 76700 US EXAM ABDOM COMPLETE: CPT

## 2021-07-20 NOTE — ANESTHESIA PREPROCEDURE EVALUATION
Review of Systems/Medical History  Patient summary reviewed  Chart reviewed      Cardiovascular  Negative cardio ROS Exercise tolerance (METS): >4,     Pulmonary  Negative pulmonary ROS        GI/Hepatic  Negative GI/hepatic ROS          Negative  ROS        Endo/Other  Negative endo/other ROS      GYN  Currently pregnant , Prior pregnancy/OB history : 3 Parity: 2,          Hematology  Anemia ,     Musculoskeletal  Negative musculoskeletal ROS        Neurology  Negative neurology ROS      Psychology   Negative psychology ROS              Physical Exam    Airway    Mallampati score: I  TM Distance: >3 FB  Neck ROM: full     Dental   No notable dental hx     Cardiovascular  Comment: Negative ROS, Rhythm: regular, Rate: normal, Cardiovascular exam normal    Pulmonary  Pulmonary exam normal Breath sounds clear to auscultation,     Other Findings        Anesthesia Plan  ASA Score- 2     Anesthesia Type- spinal with ASA Monitors  Additional Monitors:   Airway Plan:         Plan Factors-Patient not instructed to abstain from smoking on day of procedure  Patient did not smoke on day of surgery  Induction-     Postoperative Plan- Plan for postoperative opioid use  Informed Consent- Anesthetic plan and risks discussed with patient and spouse  I personally reviewed this patient with the CRNA  Discussed and agreed on the Anesthesia Plan with the CRNA  Santosh Orta
165.7

## 2021-07-26 ENCOUNTER — TELEPHONE (OUTPATIENT)
Dept: INTERNAL MEDICINE CLINIC | Facility: CLINIC | Age: 36
End: 2021-07-26

## 2021-07-26 NOTE — TELEPHONE ENCOUNTER
[address] called and wanted to go over the bw results as well as the US abdomen that she had completed this month  Please advise

## 2021-07-27 NOTE — TELEPHONE ENCOUNTER
I called the patient and reviewed the ultrasound and lab results  She continues with the pain and declined to schedule an appt to follow up    She will call at a later time to schedule if the pain persist

## 2021-12-17 ENCOUNTER — VBI (OUTPATIENT)
Dept: ADMINISTRATIVE | Facility: OTHER | Age: 36
End: 2021-12-17

## 2021-12-23 ENCOUNTER — OFFICE VISIT (OUTPATIENT)
Dept: INTERNAL MEDICINE CLINIC | Facility: CLINIC | Age: 36
End: 2021-12-23
Payer: COMMERCIAL

## 2021-12-23 VITALS
SYSTOLIC BLOOD PRESSURE: 102 MMHG | TEMPERATURE: 98.4 F | BODY MASS INDEX: 22.82 KG/M2 | OXYGEN SATURATION: 98 % | HEIGHT: 65 IN | HEART RATE: 71 BPM | DIASTOLIC BLOOD PRESSURE: 78 MMHG | WEIGHT: 137 LBS

## 2021-12-23 DIAGNOSIS — R10.9 BILATERAL FLANK PAIN: ICD-10-CM

## 2021-12-23 DIAGNOSIS — R10.11 RUQ PAIN: Primary | ICD-10-CM

## 2021-12-23 LAB
SL AMB  POCT GLUCOSE, UA: NEGATIVE
SL AMB LEUKOCYTE ESTERASE,UA: NEGATIVE
SL AMB POCT BILIRUBIN,UA: NEGATIVE
SL AMB POCT BLOOD,UA: NEGATIVE
SL AMB POCT CLARITY,UA: CLEAR
SL AMB POCT COLOR,UA: YELLOW
SL AMB POCT KETONES,UA: NORMAL
SL AMB POCT NITRITE,UA: NEGATIVE
SL AMB POCT PH,UA: 6
SL AMB POCT SPECIFIC GRAVITY,UA: 1.03
SL AMB POCT URINE PROTEIN: NEGATIVE
SL AMB POCT UROBILINOGEN: 0.2

## 2021-12-23 PROCEDURE — 99213 OFFICE O/P EST LOW 20 MIN: CPT | Performed by: INTERNAL MEDICINE

## 2021-12-23 PROCEDURE — 1036F TOBACCO NON-USER: CPT | Performed by: INTERNAL MEDICINE

## 2021-12-23 PROCEDURE — 3725F SCREEN DEPRESSION PERFORMED: CPT | Performed by: INTERNAL MEDICINE

## 2021-12-23 PROCEDURE — 81003 URINALYSIS AUTO W/O SCOPE: CPT | Performed by: INTERNAL MEDICINE

## 2021-12-23 PROCEDURE — 3008F BODY MASS INDEX DOCD: CPT | Performed by: INTERNAL MEDICINE

## 2022-04-26 ENCOUNTER — TELEPHONE (OUTPATIENT)
Dept: INTERNAL MEDICINE CLINIC | Facility: CLINIC | Age: 37
End: 2022-04-26

## 2022-04-26 ENCOUNTER — TELEMEDICINE (OUTPATIENT)
Dept: INTERNAL MEDICINE CLINIC | Facility: CLINIC | Age: 37
End: 2022-04-26
Payer: COMMERCIAL

## 2022-04-26 VITALS — TEMPERATURE: 101 F

## 2022-04-26 DIAGNOSIS — Z20.828 EXPOSURE TO INFLUENZA: ICD-10-CM

## 2022-04-26 DIAGNOSIS — B34.9 VIRAL INFECTION: Primary | ICD-10-CM

## 2022-04-26 DIAGNOSIS — J06.9 URTI (ACUTE UPPER RESPIRATORY INFECTION): ICD-10-CM

## 2022-04-26 DIAGNOSIS — B34.9 VIRAL INFECTION: ICD-10-CM

## 2022-04-26 PROCEDURE — 99213 OFFICE O/P EST LOW 20 MIN: CPT | Performed by: STUDENT IN AN ORGANIZED HEALTH CARE EDUCATION/TRAINING PROGRAM

## 2022-04-26 RX ORDER — FLUTICASONE PROPIONATE 50 MCG
1 SPRAY, SUSPENSION (ML) NASAL DAILY
Qty: 16 G | Refills: 0 | Status: SHIPPED | OUTPATIENT
Start: 2022-04-26

## 2022-04-26 RX ORDER — OSELTAMIVIR PHOSPHATE 75 MG/1
75 CAPSULE ORAL 2 TIMES DAILY
Qty: 10 CAPSULE | Refills: 0 | Status: SHIPPED | OUTPATIENT
Start: 2022-04-26 | End: 2022-05-01

## 2022-04-26 RX ORDER — OSELTAMIVIR PHOSPHATE 75 MG/1
75 CAPSULE ORAL 2 TIMES DAILY
Qty: 10 CAPSULE | Refills: 0 | Status: SHIPPED | OUTPATIENT
Start: 2022-04-26 | End: 2022-04-26 | Stop reason: SDUPTHER

## 2022-04-26 NOTE — PROGRESS NOTES
COVID-19 Outpatient Progress Note    Assessment/Plan:    Problem List Items Addressed This Visit     None      Visit Diagnoses     Viral infection    -  Primary    Relevant Medications    fluticasone (FLONASE) 50 mcg/act nasal spray    URTI (acute upper respiratory infection)        Relevant Medications    fluticasone (FLONASE) 50 mcg/act nasal spray    Exposure to influenza             Disposition:     Patient is fully vaccinated and I recommended self quarantine for 5 days followed by strict mask use for an additional 5 days  If patient were to develop symptoms, they should immediately self isolate and call our office for further guidance  After clarifying the patient's history, my suspicion for COVID-19 infection is very low  Risks and benefits of COVID-19 vaccination was discussed with patient  Overall concern for COVID-19 infection is very low  Concern for influenza infection is very high  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, she cannot report for influenza testing at this time  We will treat her empirically with a 5 day course of Tamiflu 75 mg b i d     We will also start her on once daily Flonase  She states she has nasal saline at home which I have advised her to utilize as needed throughout the day for nasal congestion  I have advised her to monitor her temperatures closely by checking her temp prior to any dose of antipyretic medication  She is to monitor for worsening of her symptoms, namely worsening fevers or shortness of breath at rest   If she does worsened despite conservative measures and Tamiflu within the next 72-96 hours, she is to call our office to inform us or report to a nearby urgent care/emergency department for prompt in-person evaluation  She endorsed understanding and acceptance of this plan  I have spent 30 minutes directly with the patient   Greater than 50% of this time was spent in counseling/coordination of care regarding: diagnostic results, prognosis, risks and benefits of treatment options, instructions for management, patient and family education, importance of treatment compliance, risk factor reductions and impressions  Encounter provider Morgan De León DO    Provider located at [address] [address]    Recent Visits  No visits were found meeting these conditions  Showing recent visits within past 7 days and meeting all other requirements  Today's Visits  Date Type Provider Dept   04/26/22 Telephone Judith Backer, [address]   04/26/22 Rhoda Crespo DO [facility]   Showing today's visits and meeting all other requirements  Future Appointments  No visits were found meeting these conditions  Showing future appointments within next 150 days and meeting all other requirements     This virtual check-in was done via telephone and she agrees to proceed  Patient agrees to participate in a virtual check in via telephone or video visit instead of presenting to the office to address urgent/immediate medical needs  Patient is aware this is a billable service  After connecting through Telephone, the patient was identified by name and date of birth  Yadira Robertson was informed that this was a telemedicine visit and that the exam was being conducted confidentially over secure lines  My office door was closed  No one else was in the room  Yadira Robertson acknowledged consent and understanding of privacy and security of the telemedicine visit  I informed the patient that I have reviewed her record in Epic and presented the opportunity for her to ask any questions regarding the visit today  The patient agreed to participate      It was my intent to perform this visit via video technology but the patient was not able to do a video connection so the visit was completed via audio telephone only  Verification of patient location:  Patient is located in the following state in which I hold an active license: [place]    Subjective:   Kayce Oswald is a 31 y o  female who is concerned about COVID-19  Patient's symptoms include fever, chills, malaise, rhinorrhea, cough, chest tightness, nausea, myalgias and headache  Patient denies fatigue, congestion, sore throat, anosmia, loss of taste, shortness of breath, abdominal pain, vomiting and diarrhea  COVID-19 vaccination status: Fully vaccinated (primary series)    Exposure:   Contact with a person who is under investigation (PUI) for or who is positive for COVID-19 within the last 14 days?: No    Hospitalized recently for fever and/or lower respiratory symptoms?: No      Currently a healthcare worker that is involved in direct patient care?: No      Works in a special setting where the risk of COVID-19 transmission may be high? (this may include long-term care, correctional and residential facilities; homeless shelters; assisted-living facilities and group homes ): No      Resident in a special setting where the risk of COVID-19 transmission may be high? (this may include long-term care, correctional and residential facilities; homeless shelters; assisted-living facilities and group homes ): No      Patient is a 30-year-old female with history of iron deficiency anemia and seasonal allergies who presents via telemedicine over the phone for evaluation flu like illness  She tells me that her children, 3 boys, have been sick for the last 24-48 hours  She tells me that her children all have tested positive for influenza and were given Tamiflu by their pediatrician  She states that 1 of her children also was given antibiotics for otitis externa  She states she began feeling unwell yesterday  She notes achiness in the back, fever, chills, night sweats, mild headache, chest tightness, A/G/scratchy throat, dry cough, and rhinorrhea  She also states she is somewhat nauseous without any vomiting  She denies any diarrhea or constipation  She denies any shortness of breath  She states no palliation in her symptoms with Delsym  She states she is taking Tylenol and ibuprofen for her fever and headaches  She states that she is feeling better than she did yesterday  She states that she and her family are fully vaccinated but have not received the boosters as they all suffered COVID infection in 2021  She states that she and no other members of her family have received this year's influenza vaccine  Lab Results   Component Value Date    SARSCOV2 Negative 11/10/2021     Past Medical History:   Diagnosis Date    Acute otitis media 10/9/2019    Anemia     Arthritis     Blood type, Rh negative     resolved 2015     BMI 21 0-21 9, adult 10/9/2019    Breech presentation     Resolved 2015     Fibroid     Need for influenza vaccination 10/9/2019    URTI (acute upper respiratory infection) 10/9/2019    Varicella     Visual impairment      Past Surgical History:   Procedure Laterality Date     SECTION      [place]  DELIVERY ONLY N/A 2018    Procedure:  SECTION (); Surgeon: Charly Poole MD;  [facility];  Service: Obstetrics    TONSILLECTOMY      WISDOM TOOTH EXTRACTION       Current Outpatient Medications   Medication Sig Dispense Refill    medroxyPROGESTERone acetate (DEPO-PROVERA SYRINGE) 150 mg/mL injection INJECT 1 ML IN THE MUSCLE Q 9 WEEKS  0    fluticasone (FLONASE) 50 mcg/act nasal spray 1 spray into each nostril daily 16 g 0    oseltamivir (TAMIFLU) 75 mg capsule Take 1 capsule (75 mg total) by mouth 2 (two) times a day for 5 days 10 capsule 0     No current facility-administered medications for this visit  Allergies   Allergen Reactions    Celecoxib Rash       Review of Systems   Constitutional: Positive for chills and fever  Negative for fatigue     HENT: Positive for rhinorrhea  Negative for congestion and sore throat  Respiratory: Positive for cough and chest tightness  Negative for shortness of breath  Gastrointestinal: Positive for nausea  Negative for abdominal pain, diarrhea and vomiting  Musculoskeletal: Positive for myalgias  Neurological: Positive for headaches  Objective:    Vitals:    04/26/22 1134   Temp: (!) 101 °F (38 3 °C)   TempSrc: Tympanic       Physical Exam  Constitutional:       Comments: Prior to disconnection, briefly connected via video chat and patient appeared comfortable and in no acute distress  VIRTUAL VISIT DISCLAIMER    Jeancarlos Ball verbally agrees to participate in [employer]  Pt is aware that [employer] could be limited without vital signs or the ability to perform a full hands-on physical Gela Dimas understands she or the provider may request at any time to terminate the video visit and request the patient to seek care or treatment in person

## 2022-04-26 NOTE — TELEPHONE ENCOUNTER
Patient would like Rx oseltamivir (TAMIFLU) 75 mg capsule to be sent to [facility]/pharmacy #[id number]- [place], [place] - [address] instead       LOV- 4 26 22 NOV- Patient will call back to schedule physical

## 2022-04-27 ENCOUNTER — TELEPHONE (OUTPATIENT)
Dept: INTERNAL MEDICINE CLINIC | Facility: CLINIC | Age: 37
End: 2022-04-27

## 2022-04-27 DIAGNOSIS — J04.0 ACUTE LARYNGITIS: Primary | ICD-10-CM

## 2022-04-27 RX ORDER — AMOXICILLIN AND CLAVULANATE POTASSIUM 875; 125 MG/1; MG/1
1 TABLET, FILM COATED ORAL EVERY 12 HOURS SCHEDULED
Qty: 20 TABLET | Refills: 0 | Status: SHIPPED | OUTPATIENT
Start: 2022-04-27 | End: 2022-05-07

## 2022-04-27 NOTE — TELEPHONE ENCOUNTER
Discussed with attending Dr Iggy Montalvo  Will send 10 day course of Augmentin 875/125 mg BID  Can take with food  Thank you!

## 2022-04-27 NOTE — TELEPHONE ENCOUNTER
Patient was last seen for a virtual appointment with Zohreh Figueroa on 4 26 22 for flu symptoms  Patient called and stated she has been taking Rx tamiflu however, her symptoms have gotten worst  She states it's hard for her to speak, it hurts to swallow and her throat hurts to touch  She would like an antibiotic to be ordered and sent to [facility], [place] - [address]

## 2022-05-12 ENCOUNTER — OFFICE VISIT (OUTPATIENT)
Dept: INTERNAL MEDICINE CLINIC | Facility: CLINIC | Age: 37
End: 2022-05-12
Payer: COMMERCIAL

## 2022-05-12 VITALS
TEMPERATURE: 99.3 F | HEART RATE: 76 BPM | OXYGEN SATURATION: 98 % | DIASTOLIC BLOOD PRESSURE: 72 MMHG | WEIGHT: 136.8 LBS | BODY MASS INDEX: 23.35 KG/M2 | SYSTOLIC BLOOD PRESSURE: 102 MMHG | HEIGHT: 64 IN

## 2022-05-12 DIAGNOSIS — R07.89 OTHER CHEST PAIN: Primary | ICD-10-CM

## 2022-05-12 PROCEDURE — 3008F BODY MASS INDEX DOCD: CPT | Performed by: INTERNAL MEDICINE

## 2022-05-12 PROCEDURE — 99213 OFFICE O/P EST LOW 20 MIN: CPT | Performed by: INTERNAL MEDICINE

## 2022-05-12 PROCEDURE — 1036F TOBACCO NON-USER: CPT | Performed by: INTERNAL MEDICINE

## 2022-05-12 NOTE — ASSESSMENT & PLAN NOTE
Recent viral upper respiratory infection with likely some residual chest wall discomfort from coughing  No evidence of clinical infection or pneumonia  Continued observation, supportive and symptomatic treatment with Tylenol, OTC cough suppressants as needed  Patient was cautioned against vaping as well has the use of tobacco products

## 2022-05-12 NOTE — PROGRESS NOTES
Assessment/Plan:    Other chest pain  Recent viral upper respiratory infection with likely some residual chest wall discomfort from coughing  No evidence of clinical infection or pneumonia  Continued observation, supportive and symptomatic treatment with Tylenol, OTC cough suppressants as needed  Patient was cautioned against vaping as well has the use of tobacco products  Subjective:      Patient ID: Juwan Landers is a 31 y o  female  Patient presents to the office for follow-up visit  She has been experiencing some chest discomfort following recent viral upper respiratory infection presumed to be influenza  Her children were diagnosed with influenza earlier in April and then shortly afterwards she began feeling ill  She describes her viral illness as being worse than when she had COVID significant fatigue and myalgias  She was coughing and she had some chest discomfort  She has some concerns regarding her chest pain because she smokes an occasional cigarette and vapes occasionally and is wondering if that could possibly do some damage to her lungs  Review of Systems   Constitutional: Negative  HENT: Negative  Eyes: Negative  Respiratory: Positive for cough  Negative for shortness of breath  Cardiovascular: Positive for chest pain (More muscular and movement dependent)  Gastrointestinal: Negative  Endocrine: Negative  Genitourinary: Negative  Musculoskeletal: Negative  Hematological: Negative  Psychiatric/Behavioral: The patient is nervous/anxious (Anxious relating to symptoms)  Objective:      /72 (BP Location: Left arm, Patient Position: Sitting, Cuff Size: Standard)   Pulse 76   Temp 99 3 °F (37 4 °C) (Tympanic)   Ht 5' 4 37" (1 635 m)   Wt 62 1 kg (136 lb 12 8 oz)   SpO2 98%   BMI 23 21 kg/m²          Physical Exam  Vitals reviewed  Constitutional:       General: She is not in acute distress  Appearance: Normal appearance   She is normal weight  She is not ill-appearing, toxic-appearing or diaphoretic  HENT:      Head: Normocephalic and atraumatic  Right Ear: External ear normal       Left Ear: External ear normal       Nose: Nose normal    Eyes:      General: No scleral icterus  Conjunctiva/sclera: Conjunctivae normal       Pupils: Pupils are equal, round, and reactive to light  Cardiovascular:      Rate and Rhythm: Normal rate and regular rhythm  Heart sounds: Normal heart sounds  No murmur heard  No friction rub  Pulmonary:      Effort: Pulmonary effort is normal  No respiratory distress  Breath sounds: Normal breath sounds  No stridor  No wheezing, rhonchi or rales  Chest:      Chest wall: No tenderness  Abdominal:      General: Abdomen is flat  There is no distension  Musculoskeletal:      Cervical back: Neck supple  Right lower leg: No edema  Left lower leg: No edema  Lymphadenopathy:      Cervical: No cervical adenopathy  Skin:     General: Skin is warm  Coloration: Skin is not jaundiced  Findings: No bruising, erythema or rash  Neurological:      Mental Status: She is alert  Mental status is at baseline  She is disoriented  Psychiatric:         Mood and Affect: Mood normal          Behavior: Behavior normal          Thought Content:  Thought content normal          Judgment: Judgment normal

## 2022-07-05 NOTE — ASSESSMENT & PLAN NOTE
Continue Flonase as needed OTC decongestants as needed PT scheduled for telemed with Dr. Brock on 10/4 @ 2:40. TY

## 2022-10-12 PROBLEM — Z13.6 ENCOUNTER FOR SCREENING FOR CARDIOVASCULAR DISORDERS: Status: RESOLVED | Noted: 2021-05-20 | Resolved: 2022-10-12

## 2023-01-04 ENCOUNTER — TELEPHONE (OUTPATIENT)
Dept: INTERNAL MEDICINE CLINIC | Facility: CLINIC | Age: 38
End: 2023-01-04

## 2023-01-04 NOTE — TELEPHONE ENCOUNTER
Patient's  Order for MRI of abdomen  on 22  Would like a new order put in so she can schedule this test        Please call patient back with answer

## 2023-01-10 ENCOUNTER — TELEPHONE (OUTPATIENT)
Dept: ADMINISTRATIVE | Facility: OTHER | Age: 38
End: 2023-01-10

## 2023-01-10 NOTE — TELEPHONE ENCOUNTER
----- Message from Blaise Jay sent at 1/10/2023 11:17 AM EST -----  Regarding: pap test - [facility]  01/10/23 11:17 AM    Hello, our patient Jonathan You has had Pap Smear (HPV) aka Cervical Cancer Screening completed/performed  Please assist in updating the patient chart by pulling the Care Everywhere (CE) document  The date of service is 03/07/2022       Thank you,  Blaise Jay  [facility]

## 2023-01-10 NOTE — TELEPHONE ENCOUNTER
Upon review of the In Basket request we were able to locate, review, and update the patient chart as requested for Pap Smear (HPV) aka Cervical Cancer Screening  Any additional questions or concerns should be emailed to the Practice Liaisons via the appropriate education email address, please do not reply via In Basket  Thank you  Vanessa Palacio         01/10/23 2:57 PM     [facility] [employer] SmartForm used to document caregap status      Vanessa Palacio

## 2023-01-11 ENCOUNTER — OFFICE VISIT (OUTPATIENT)
Dept: INTERNAL MEDICINE CLINIC | Facility: CLINIC | Age: 38
End: 2023-01-11

## 2023-01-11 VITALS
HEIGHT: 64 IN | WEIGHT: 145 LBS | BODY MASS INDEX: 24.75 KG/M2 | DIASTOLIC BLOOD PRESSURE: 68 MMHG | OXYGEN SATURATION: 98 % | TEMPERATURE: 98.9 F | HEART RATE: 73 BPM | SYSTOLIC BLOOD PRESSURE: 98 MMHG

## 2023-01-11 DIAGNOSIS — G89.29 CHRONIC RUQ PAIN: Primary | ICD-10-CM

## 2023-01-11 DIAGNOSIS — R10.9 BILATERAL FLANK PAIN: ICD-10-CM

## 2023-01-11 DIAGNOSIS — R10.11 CHRONIC RUQ PAIN: Primary | ICD-10-CM

## 2023-01-11 NOTE — PROGRESS NOTES
Name: Viet Mosqueda      : 1985      MRN: [id number]  Encounter Provider: Kevin Amaya MD  Encounter Date: 2023   Encounter department: [address] [place] [address]  Chronic RUQ pain  Assessment & Plan:  Recurrent right upper quadrant discomfort sometimes related to meals sometimes not  MRI with MRCP had been scheduled but apparently the order had   Test is reordered along with additional labs    Orders:  -     CBC and differential; Future  -     Comprehensive metabolic panel; Future  -     MRI abdomen w wo contrast and mrcp; Future; Expected date: 2023  -     C-reactive protein; Future    2  Bilateral flank pain  Assessment & Plan: We will check a urinalysis and reflex to culture CBC and metabolic panel as well    Orders:  -     CBC and differential; Future  -     Comprehensive metabolic panel; Future  -     MRI abdomen w wo contrast and mrcp; Future; Expected date: 2023  -     C-reactive protein; Future  -     UA w Reflex to Microscopic w Reflex to Culture -Lab Collect; Future; Expected date: 2023           Subjective        Patient presents to the office for follow-up visit  She has been experiencing intermittent right upper quadrant abdominal discomfort for the past year  Previous abdominal ultrasound disclosed gallbladder debris  She had been scheduled for an abdominal MRI and MRCP but had been unable to make an appointment for that test for various reasons  She has had no follow-up lab work which will be ordered today  Review of Systems   Gastrointestinal: Positive for abdominal pain (  Intermittent right upper quadrant pain not always related to meals  Not associated with fever chills, nausea or vomiting)  Genitourinary: Positive for flank pain ( intermittent left flank pain  No hematuria )         Current Outpatient Medications on File Prior to Visit   Medication Sig   • medroxyPROGESTERone acetate (DEPO-PROVERA SYRINGE) 150 mg/mL injection INJECT 1 ML IN THE MUSCLE Q 9 WEEKS   • [DISCONTINUED] fluticasone (FLONASE) 50 mcg/act nasal spray 1 spray into each nostril daily       Objective     BP 98/68 (BP Location: Left arm, Patient Position: Sitting, Cuff Size: Standard)   Pulse 73   Temp 98 9 °F (37 2 °C) (Tympanic)   Ht 5' 4 41" (1 636 m)   Wt 65 8 kg (145 lb)   SpO2 98%   BMI 24 57 kg/m²     Physical Exam  Vitals reviewed  Constitutional:       General: She is not in acute distress  Appearance: Normal appearance  She is normal weight  She is not ill-appearing, toxic-appearing or diaphoretic  HENT:      Head: Normocephalic and atraumatic  Right Ear: External ear normal       Left Ear: External ear normal    Eyes:      General: No scleral icterus  Conjunctiva/sclera: Conjunctivae normal       Pupils: Pupils are equal, round, and reactive to light  Neck:      Vascular: No JVD  Trachea: No tracheal deviation  Cardiovascular:      Rate and Rhythm: Normal rate  Pulmonary:      Effort: Pulmonary effort is normal  No respiratory distress  Abdominal:      General: Abdomen is flat  Bowel sounds are normal  There is no distension  Palpations: There is no mass  Tenderness: There is abdominal tenderness (  Right upper quadrant tenderness on deep palpation)  There is no right CVA tenderness, left CVA tenderness, guarding or rebound  Musculoskeletal:         General: No swelling  Cervical back: Neck supple  Right lower leg: No edema  Left lower leg: No edema  Skin:     General: Skin is warm  Coloration: Skin is not jaundiced  Findings: No bruising, erythema or rash  Neurological:      General: No focal deficit present  Mental Status: She is alert and oriented to person, place, and time  Mental status is at baseline  Psychiatric:         Mood and Affect: Mood normal          Behavior: Behavior normal          Thought Content:  Thought content normal          Judgment: Judgment normal        Shaw Ashford MD

## 2023-01-11 NOTE — ASSESSMENT & PLAN NOTE
Recurrent right upper quadrant discomfort sometimes related to meals sometimes not  MRI with MRCP had been scheduled but apparently the order had     Test is reordered along with additional labs

## 2023-01-26 ENCOUNTER — TELEPHONE (OUTPATIENT)
Dept: INTERNAL MEDICINE CLINIC | Facility: CLINIC | Age: 38
End: 2023-01-26

## 2023-01-26 NOTE — TELEPHONE ENCOUNTER
Patient called about blood work results done at [facility] lab  She would like a call back bout the results

## 2023-03-01 ENCOUNTER — HOSPITAL ENCOUNTER (OUTPATIENT)
Dept: RADIOLOGY | Facility: HOSPITAL | Age: 38
Discharge: HOME | End: 2023-03-01
Attending: INTERNAL MEDICINE

## 2023-03-01 DIAGNOSIS — R10.11 CHRONIC RUQ PAIN: ICD-10-CM

## 2023-03-01 DIAGNOSIS — R10.9 BILATERAL FLANK PAIN: ICD-10-CM

## 2023-03-01 DIAGNOSIS — G89.29 CHRONIC RUQ PAIN: ICD-10-CM

## 2023-03-01 RX ADMIN — GADOBUTROL 6 ML: 604.72 INJECTION INTRAVENOUS at 20:24

## 2023-08-01 ENCOUNTER — OFFICE VISIT (OUTPATIENT)
Dept: INTERNAL MEDICINE CLINIC | Age: 38
End: 2023-08-01
Payer: COMMERCIAL

## 2023-08-01 ENCOUNTER — APPOINTMENT (OUTPATIENT)
Dept: RADIOLOGY | Age: 38
End: 2023-08-01
Payer: COMMERCIAL

## 2023-08-01 VITALS
HEIGHT: 64 IN | SYSTOLIC BLOOD PRESSURE: 110 MMHG | HEART RATE: 74 BPM | DIASTOLIC BLOOD PRESSURE: 72 MMHG | BODY MASS INDEX: 24.07 KG/M2 | OXYGEN SATURATION: 98 % | TEMPERATURE: 99.4 F | WEIGHT: 141 LBS

## 2023-08-01 DIAGNOSIS — M25.562 ACUTE PAIN OF LEFT KNEE: Primary | ICD-10-CM

## 2023-08-01 DIAGNOSIS — M25.562 ACUTE PAIN OF LEFT KNEE: ICD-10-CM

## 2023-08-01 PROCEDURE — 73562 X-RAY EXAM OF KNEE 3: CPT

## 2023-08-01 PROCEDURE — 99213 OFFICE O/P EST LOW 20 MIN: CPT | Performed by: FAMILY MEDICINE

## 2023-08-01 NOTE — PROGRESS NOTES
Assessment/Plan:    1. Acute pain of left knee  -     XR knee 3 vw left non injury; Future; Expected date: 08/01/2023  -     Ambulatory Referral to Physical Therapy; Future            There are no Patient Instructions on file for this visit. Return for Next scheduled follow up. Subjective:      Patient ID: Victoria Adam is a 41 y.o. female. Chief Complaint   Patient presents with   • Knee Pain     Left knee pain- it comes and goes - patient has for 6-8 months off and on- patient requests an xray - stairs are hand- patient is not using anything       HPI  Ongoing left knee pain for several months, no trauma no falls no twisting turning. Denies any issues previously. Pain is not getting worse but is just persistent. She continues with her activities of daily living but has not had this checked. It does not require any pain medication or topical analgesics. She states she has pain going up the steps but rarely down the steps and also when she does bike riding with her children afterward it is very sore.   Pain usually resolves on its own        The following portions of the patient's history were reviewed and updated as appropriate: allergies, current medications, past family history, past medical history, past social history, past surgical history and problem list.    Review of Systems      Constitutional:  Denies fever or chills   Eyes:  Denies double , blurry vision or eye pain  HENT:  Denies nasal congestion, sore throat or new hearing issues  Respiratory:  Denies cough or shortness of breath or wheezing  Cardiovascular:  Denies palpitations or chest pain  GI:  Denies abdominal pain, nausea, or vomiting, no loose stools, no reflux  Integument:  Denies rash , no open areas  Neurologic:  Denies headache or focal weakness, no dizziness  : no dysuria, or hematuria      Current Outpatient Medications   Medication Sig Dispense Refill   • medroxyPROGESTERone acetate (DEPO-PROVERA SYRINGE) 150 mg/mL injection INJECT 1 ML IN THE MUSCLE Q 9 WEEKS  0     No current facility-administered medications for this visit. Objective:    /72 (BP Location: Left arm, Patient Position: Sitting, Cuff Size: Standard)   Pulse 74   Temp 99.4 °F (37.4 °C) (Temporal)   Ht 5' 4.41" (1.636 m)   Wt 64 kg (141 lb)   SpO2 98%   BMI 23.90 kg/m²        Physical Exam       Constitutional:  Well developed, well nourished, no acute distress, non-toxic appearance   Eyes:  PERRL, conjunctiva normal , non icteric sclera  HENT:  Atraumatic, oropharynx moist. Neck-  supple   Respiratory:  CTA b/l, normal breath sounds, no rales, no wheezing   Cardiovascular:  RRR, no murmurs, no LE edema b/l  GI:  Soft, nondistended, normal bowel sounds x 4, nontender, no organomegaly, no mass, no rebound, no guarding   Neurologic:  no focal deficits noted   Psychiatric:  Speech and behavior appropriate , AAO x 3  Musculoskeletal, no limp, no bony pain to palpation, no swelling noted in the left knee.   Nontender on exam above and below the knee joint        Paty Edmonds,

## 2024-06-20 ENCOUNTER — OFFICE VISIT (OUTPATIENT)
Dept: INTERNAL MEDICINE CLINIC | Age: 39
End: 2024-06-20
Payer: COMMERCIAL

## 2024-06-20 VITALS
DIASTOLIC BLOOD PRESSURE: 70 MMHG | WEIGHT: 140 LBS | SYSTOLIC BLOOD PRESSURE: 106 MMHG | OXYGEN SATURATION: 99 % | HEART RATE: 68 BPM | HEIGHT: 65 IN | BODY MASS INDEX: 23.32 KG/M2 | TEMPERATURE: 97.2 F

## 2024-06-20 DIAGNOSIS — Z00.00 ANNUAL PHYSICAL EXAM: Primary | ICD-10-CM

## 2024-06-20 DIAGNOSIS — Z13.220 SCREENING CHOLESTEROL LEVEL: ICD-10-CM

## 2024-06-20 PROBLEM — M25.562 ACUTE PAIN OF LEFT KNEE: Status: RESOLVED | Noted: 2023-08-01 | Resolved: 2024-06-20

## 2024-06-20 PROCEDURE — 99395 PREV VISIT EST AGE 18-39: CPT | Performed by: FAMILY MEDICINE

## 2024-06-20 NOTE — PROGRESS NOTES
Assessment/Plan:    1. Annual physical exam  -     Comprehensive metabolic panel; Future  -     CBC and differential; Future  -     TSH, 3rd generation with Free T4 reflex; Future  2. Screening cholesterol level  -     Lipid Panel with Direct LDL reflex; Future          There are no Patient Instructions on file for this visit.    Return for Annual physical.    Subjective:      Patient ID: Francesca Corral is a 38 y.o. female.    Chief Complaint   Patient presents with    Annual Exam     Yearly physical       HPI      Adult Annual Physical   Patient here for a comprehensive physical exam. The patient reports no problems.    Diet and Physical Activity  Diet/Nutrition: well balanced diet.   Exercise: moderate cardiovascular exercise.      Depression Screening  PHQ-9 Depression Screening    PHQ-9:    Frequency of the following problems over the past two weeks:            General Health  Sleep: sleeps well.   Hearing: normal - bilateral.  Vision: no vision problems.   Dental: regular dental visits.       /GYN Health  Patient is: premenopausal  Last menstrual period:  on depo  Contraceptive method: injectable contraception    Self breast exams; intermittent   PAP UTD- normal       The following portions of the patient's history were reviewed and updated as appropriate: allergies, current medications, past family history, past medical history, past social history, past surgical history and problem list.    Review of Systems      Constitutional:  Denies fever or chills   Eyes:  Denies double , blurry vision or eye pain  HENT:  Denies nasal congestion, sore throat or new hearing issues  Respiratory:  Denies cough or shortness of breath or wheezing  Cardiovascular:  Denies palpitations or chest pain  GI:  Denies abdominal pain, nausea, or vomiting, no loose stools, no reflux  Integument:  Denies rash , no open areas  Neurologic:  Denies headache or focal weakness, no dizziness  : no dysuria, or hematuria      Current  "Outpatient Medications   Medication Sig Dispense Refill    medroxyPROGESTERone acetate (DEPO-PROVERA SYRINGE) 150 mg/mL injection INJECT 1 ML IN THE MUSCLE Q 9 WEEKS  0     No current facility-administered medications for this visit.       Objective:    /70 (BP Location: Left arm, Patient Position: Sitting, Cuff Size: Standard)   Pulse 68   Temp (!) 97.2 °F (36.2 °C) (Temporal)   Ht 5' 5\" (1.651 m)   Wt 63.5 kg (140 lb)   SpO2 99% Comment: room air  BMI 23.30 kg/m²        Physical Exam      Constitutional:  Well developed, well nourished, no acute distress, non-toxic appearance   Eyes:  PERRL, conjunctiva normal , non icteric sclera  HENT:  Atraumatic, oropharynx moist. Neck-  supple   Respiratory:  CTA b/l, normal breath sounds, no rales, no wheezing   Cardiovascular:  RRR, no murmurs, no LE edema b/l  GI:  Soft, nondistended, normal bowel sounds x 4, nontender, no organomegaly, no mass, no rebound, no guarding   Neurologic:  no focal deficits noted   Psychiatric:  Speech and behavior appropriate , AAO x 3      Sarah Stewart DO  "

## 2025-02-12 ENCOUNTER — CLINICAL SUPPORT (OUTPATIENT)
Dept: INTERNAL MEDICINE CLINIC | Age: 40
End: 2025-02-12
Payer: COMMERCIAL

## 2025-02-12 DIAGNOSIS — Z11.1 SCREENING FOR TUBERCULOSIS: Primary | ICD-10-CM

## 2025-02-12 PROCEDURE — 86580 TB INTRADERMAL TEST: CPT

## 2025-02-14 ENCOUNTER — CLINICAL SUPPORT (OUTPATIENT)
Dept: INTERNAL MEDICINE CLINIC | Age: 40
End: 2025-02-14

## 2025-02-14 DIAGNOSIS — Z11.1 ENCOUNTER FOR PPD SKIN TEST READING: Primary | ICD-10-CM

## 2025-02-14 LAB
INDURATION: 0 MM
TB SKIN TEST: NEGATIVE

## 2025-02-14 PROCEDURE — 99905: CPT | Performed by: FAMILY MEDICINE

## 2025-06-02 ENCOUNTER — NURSE TRIAGE (OUTPATIENT)
Age: 40
End: 2025-06-02

## 2025-06-02 NOTE — TELEPHONE ENCOUNTER
"REASON FOR CONVERSATION: Tick Bite    SYMPTOMS: red ring like rash around tick bite. See triage below    OTHER HEALTH INFORMATION: Pt reports that she took her son's extra doxycycline 200 mg, once last night and once this morning. This morning, following dose without food, Pt vomited. Advised Pt not to take additional doses of medication without clearing with Medical provider.     PROTOCOL DISPOSITION: Go to Urgent Care Now (overriding See Today in Office)    CARE ADVICE PROVIDED: Advised same day exam of site. No same day appointments at any \[facility]\"" office, confirmed. Advised Pt to proceed to urgent care today and to inform Provider that she has taken the two doses of doxycycline already. Pt verbalized understanding and is agreeable with plan.     PRACTICE FOLLOW-UP:  F/U    Triage forwarded for PCP review.    Reason for Disposition   Red ring or bull's-eye rash occurs around a deer tick bite    Answer Assessment - Initial Assessment Questions  1. ATTACHED:  \"Is the tick still on the skin?\"  (e.g., yes, no, unsure)      No    2. ONSET - TICK NOT STILL ATTACHED: \"If the tick has been removed, how long do you think the tick was attached before you removed it?\" (e.g., 5 hours, 2 days). \"When was this?\"      Noted tick yesterday when in the shower. Came off in the shower with loofah sponge and water. Unsure how long tick was attached.    3. LOCATION: \"Where is the tick bite located?\" (e.g., arm, leg)      Right hip    4. TYPE of TICK: \"Is it a wood tick or a deer tick?\" (e.g., deer tick, wood tick; unsure)      Pt reports tick was \"nymph\" very small    5. OTHER SYMPTOMS: \"Do you have any other symptoms?\" (e.g., fever, rash, redness at bite area, red ring around bite)      Ring like rash with redness around the bite. Not painful or itchy.    Protocols used: Tick Bite-Adult-[place]    "

## (undated) DEVICE — SUT VICRYL 0 CT-1 36 IN J946H

## (undated) DEVICE — PROXIMATE PLUS MD MULTI-DIRECTIONAL RELEASE SKIN STAPLERS CONTAINS 35 STAINLESS STEEL STAPLES APPROXIMATE CLOSED DIMENSIONS: 6.9MM X 3.9MM WIDE: Brand: PROXIMATE

## (undated) DEVICE — ABG MICROSTICKS SAFETY

## (undated) DEVICE — PACK C-SECTION PBDS

## (undated) DEVICE — GLOVE INDICATOR PI UNDERGLOVE SZ 7 BLUE

## (undated) DEVICE — SCD SEQUENTIAL COMPRESSION COMFORT SLEEVE MEDIUM KNEE LENGTH: Brand: KENDALL SCD

## (undated) DEVICE — GLOVE SRG BIOGEL ECLIPSE 7

## (undated) DEVICE — SUT PLAIN 3-0 CT-1 27 IN 842H

## (undated) DEVICE — SUT VICRYL 0 CTX 36 IN J978H

## (undated) DEVICE — CHLORAPREP HI-LITE 10.5ML ORANGE